# Patient Record
Sex: MALE | Race: WHITE | NOT HISPANIC OR LATINO | Employment: FULL TIME | URBAN - METROPOLITAN AREA
[De-identification: names, ages, dates, MRNs, and addresses within clinical notes are randomized per-mention and may not be internally consistent; named-entity substitution may affect disease eponyms.]

---

## 2017-04-07 ENCOUNTER — OFFICE VISIT (OUTPATIENT)
Dept: FAMILY MEDICINE | Facility: CLINIC | Age: 32
End: 2017-04-07
Payer: COMMERCIAL

## 2017-04-07 VITALS
HEIGHT: 72 IN | DIASTOLIC BLOOD PRESSURE: 80 MMHG | BODY MASS INDEX: 25.47 KG/M2 | TEMPERATURE: 97.1 F | OXYGEN SATURATION: 98 % | HEART RATE: 71 BPM | WEIGHT: 188 LBS | SYSTOLIC BLOOD PRESSURE: 110 MMHG

## 2017-04-07 DIAGNOSIS — R21 RASH: Primary | ICD-10-CM

## 2017-04-07 DIAGNOSIS — H02.9 EYELID LESION: ICD-10-CM

## 2017-04-07 PROCEDURE — 99203 OFFICE O/P NEW LOW 30 MIN: CPT | Performed by: FAMILY MEDICINE

## 2017-04-07 RX ORDER — TRIAMCINOLONE ACETONIDE 1 MG/G
CREAM TOPICAL
Qty: 30 G | Refills: 1 | Status: SHIPPED | OUTPATIENT
Start: 2017-04-07 | End: 2017-08-30

## 2017-04-07 NOTE — MR AVS SNAPSHOT
After Visit Summary   4/7/2017    Ac Becerra    MRN: [id number]           Patient Information     Date Of Birth          1985        Visit Information        Provider Department      4/7/2017 4:20 PM Quincy Mathew MD [facility] [place] [facility]        Today's Diagnoses     Rash    -  1    Eyelid lesion           Follow-ups after your visit        Additional Services     DERMATOLOGY REFERRAL       Your provider has referred you to: [facility] [phone]   [email or web address]    Please be aware that coverage of these services is subject to the terms and limitations of your health insurance plan.  Call member services at your health plan with any benefit or coverage questions.      Please bring the following with you to your appointment:    (1) Any X-Rays, CTs or MRIs which have been performed.  Contact the facility where they were done to arrange for  prior to your scheduled appointment.    (2) List of current medications  (3) This referral request   (4) Any documents/labs given to you for this referral            OPHTHALMOLOGY ADULT REFERRAL       Your provider has referred you to: FMG: [facility] [phone]   [email or web address]  UMP: [facility] [phone]   [email or web address]  UM: [facility] [phone]   [email or web address]  N: [facility] [phone]   [email or web address]    Please be aware that coverage of these services is subject to the terms and limitations of your health insurance plan.  Call member services at your health plan with any benefit or coverage questions.      Please bring the following with you to your appointment:    (1) Any X-Rays, CTs or MRIs which have been performed.  Contact the facility  "where they were done to arrange for  prior to your scheduled appointment.    (2) List of current medications  (3) This referral request   (4) Any documents/labs given to you for this referral                  Your next 10 appointments already scheduled     Apr 07, 2017  4:20 PM CDT   Office Visit with Quincy Mathew MD   [facility]    [address] [facility] [address] [phone]           Bring a current list of meds and any records pertaining to this visit.  For Physicals, please bring immunization records and any forms needing to be filled out.  Please arrive 10 minutes early to complete paperwork.              Who to contact     If you have questions or need follow up information about today's clinic visit or your schedule please contact [facility] directly at [phone].  Normal or non-critical lab and imaging results will be communicated to you by MyChart, letter or phone within 4 business days after the clinic has received the results. If you do not hear from us within 7 days, please contact the clinic through MyChart or phone. If you have a critical or abnormal lab result, we will notify you by phone as soon as possible.  Submit refill requests through [employer] or call your pharmacy and they will forward the refill request to us. Please allow 3 business days for your refill to be completed.          Additional Information About Your Visit        [employer] Information     [employer] lets you send messages to your doctor, view your test results, renew your prescriptions, schedule appointments and more. To sign up, go to [email or web address] . Click on \"Log in\" on the left side of the screen, which will take you to the Welcome page. Then click on \"Sign up Now\" on the right side of the page.     You will be asked to enter the access code listed below, as well as some personal information. Please follow the directions to create your " username and password.     Your access code is: [email or web address]  Expires: 2017  4:06 PM     Your access code will  in 90 days. If you need help or a new code, please call your [facility] clinic or [phone].        Care EveryWhere ID     This is your Care EveryWhere ID. This could be used by other organizations to access your [facility] medical records  [id number]        Your Vitals Were     Pulse Temperature Height Pulse Oximetry BMI (Body Mass Index)       71 97.1  F (36.2  C) (Oral) 6' (1.829 m) 98% 25.5 kg/m2        Blood Pressure from Last 3 Encounters:   17 110/80    Weight from Last 3 Encounters:   17 188 lb (85.3 kg)              We Performed the Following     DERMATOLOGY REFERRAL     OPHTHALMOLOGY ADULT REFERRAL          Today's Medication Changes          These changes are accurate as of: 17  4:06 PM.  If you have any questions, ask your nurse or doctor.               Start taking these medicines.        Dose/Directions    triamcinolone 0.1 % cream   Commonly known as:  KENALOG   Used for:  Rash   Started by:  Quincy Mathew MD        Apply sparingly to affected area two times daily for up to 10 days.   Quantity:  30 g   Refills:  1            Where to get your medicines      These medications were sent to [facility] [address], [facility] [address]  [address] [place] [address]     Phone:  [phone]     triamcinolone 0.1 % cream                Primary Care Provider Office Phone # Fax #    [facility] [phone]       [address] [place] [address]. [facility] [address]        Thank you!     Thank you for choosing [facility]  for your care. Our goal is always to provide you with excellent care. Hearing back from our patients is one way we can continue to improve our services. Please take a few minutes to complete the written survey that you may receive in the mail after your visit with us. Thank you!              Your Updated Medication List - Protect others around you: Learn how to safely use, store and throw away your medicines at www.disposemymeds.org.          This list is accurate as of: 4/7/17  4:06 PM.  Always use your most recent med list.                   Brand Name Dispense Instructions for use    triamcinolone 0.1 % cream    KENALOG    30 g    Apply sparingly to affected area two times daily for up to 10 days.

## 2017-04-07 NOTE — NURSING NOTE
Chief Complaint   Patient presents with     Derm Problem     rash on right cheek     Eye Problem     right        Initial /80  Pulse 71  Temp 97.1  F (36.2  C) (Oral)  Ht 6' (1.829 m)  Wt 188 lb (85.3 kg)  SpO2 98%  BMI 25.5 kg/m2 Estimated body mass index is 25.5 kg/(m^2) as calculated from the following:    Height as of this encounter: 6' (1.829 m).    Weight as of this encounter: 188 lb (85.3 kg).  Medication Reconciliation: complete   Denisse Nichols, CMA

## 2017-04-11 ENCOUNTER — TELEPHONE (OUTPATIENT)
Dept: OTHER | Facility: CLINIC | Age: 32
End: 2017-04-11

## 2017-04-11 NOTE — TELEPHONE ENCOUNTER
4/11/2017    Call Regarding Onboarding are Choices    Attempt 1    Message on voicemail     Comments:       Outreach   jani

## 2017-06-05 ENCOUNTER — TELEPHONE (OUTPATIENT)
Dept: OTHER | Facility: CLINIC | Age: 32
End: 2017-06-05

## 2017-07-28 ENCOUNTER — TRANSFERRED RECORDS (OUTPATIENT)
Dept: HEALTH INFORMATION MANAGEMENT | Facility: CLINIC | Age: 32
End: 2017-07-28

## 2017-08-30 ENCOUNTER — OFFICE VISIT (OUTPATIENT)
Dept: FAMILY MEDICINE | Facility: CLINIC | Age: 32
End: 2017-08-30
Payer: COMMERCIAL

## 2017-08-30 VITALS
WEIGHT: 191 LBS | TEMPERATURE: 98.3 F | HEART RATE: 79 BPM | SYSTOLIC BLOOD PRESSURE: 136 MMHG | BODY MASS INDEX: 25.9 KG/M2 | DIASTOLIC BLOOD PRESSURE: 80 MMHG

## 2017-08-30 DIAGNOSIS — Z23 NEED FOR VACCINATION: ICD-10-CM

## 2017-08-30 DIAGNOSIS — H10.9 BACTERIAL CONJUNCTIVITIS OF LEFT EYE: Primary | ICD-10-CM

## 2017-08-30 PROCEDURE — 99212 OFFICE O/P EST SF 10 MIN: CPT | Mod: 25 | Performed by: PHYSICIAN ASSISTANT

## 2017-08-30 PROCEDURE — 90471 IMMUNIZATION ADMIN: CPT | Performed by: PHYSICIAN ASSISTANT

## 2017-08-30 PROCEDURE — 90715 TDAP VACCINE 7 YRS/> IM: CPT | Performed by: PHYSICIAN ASSISTANT

## 2017-08-30 RX ORDER — OFLOXACIN 3 MG/ML
1 SOLUTION/ DROPS OPHTHALMIC EVERY 4 HOURS
Qty: 3 ML | Refills: 0 | Status: SHIPPED | OUTPATIENT
Start: 2017-08-30 | End: 2017-09-06

## 2017-08-30 NOTE — MR AVS SNAPSHOT
"              After Visit Summary   2017    Ac Becerra    MRN: [id number]           Patient Information     Date Of Birth          1985        Visit Information        Provider Department      2017 9:20 AM Vanessa Floyd PA-C [facility]        Today's Diagnoses     Bacterial conjunctivitis of left eye    -  1    Need for vaccination           Follow-ups after your visit        Who to contact     Normal or non-critical lab and imaging results will be communicated to you by [facility], letter or phone within 4 business days after the clinic has received the results. If you do not hear from us within 7 days, please contact the clinic through [facility] or phone. If you have a critical or abnormal lab result, we will notify you by phone as soon as possible.  Submit refill requests through [employer] or call your pharmacy and they will forward the refill request to us. Please allow 3 business days for your refill to be completed.          If you need to speak with a  for additional information , please call: [phone]             Additional Information About Your Visit        [facility] Information     [employer] lets you send messages to your doctor, view your test results, renew your prescriptions, schedule appointments and more. To sign up, go to [email or web address] . Click on \"Log in\" on the left side of the screen, which will take you to the Welcome page. Then click on \"Sign up Now\" on the right side of the page.     You will be asked to enter the access code listed below, as well as some personal information. Please follow the directions to create your username and password.     Your access code is: [email or web address]  Expires: 2017  9:36 AM     Your access code will  in 90 days. If you need help or a new code, please call your [facility] or [phone].        Care EveryWhere ID     This is your Care EveryWhere ID. This could be used by other organizations " to access your [facility] medical records  [id number]        Your Vitals Were     Pulse Temperature BMI (Body Mass Index)             79 98.3  F (36.8  C) (Oral) 25.9 kg/m2          Blood Pressure from Last 3 Encounters:   08/30/17 136/80   04/07/17 110/80    Weight from Last 3 Encounters:   08/30/17 191 lb (86.6 kg)   04/07/17 188 lb (85.3 kg)              We Performed the Following     ADMIN 1st VACCINE     TDAP VACCINE (ADACEL)          Today's Medication Changes          These changes are accurate as of: 8/30/17  9:36 AM.  If you have any questions, ask your nurse or doctor.               Start taking these medicines.        Dose/Directions    ofloxacin 0.3 % ophthalmic solution   Commonly known as:  OCUFLOX   Used for:  Bacterial conjunctivitis of left eye   Started by:  Vanessa Floyd PA-C        Dose:  1 drop   Place 1 drop Into the left eye every 4 hours for 7 days   Quantity:  3 mL   Refills:  0         Stop taking these medicines if you haven't already. Please contact your care team if you have questions.     triamcinolone 0.1 % cream   Commonly known as:  KENALOG   Stopped by:  Vanessa Floyd PA-C                Where to get your medicines      These medications were sent to [facility] - [address] [facility]  [address] [facility] [address] [facility] [address] [place] [address]     Phone:  [phone]     ofloxacin 0.3 % ophthalmic solution                Primary Care Provider Office Phone # Fax #    [facility] [phone] [address] [facility]. [facility] [address]        Equal Access to Services     [facility] AH: Hadii aad ku hadasho Soomaali, waaxda luqadaha, qaybta kaalmada adeegyada, ashish hutton haybenito henry. So [facility] [phone].    ATENCIÓN: Si habla español, tiene a quintero disposición servicios gratuitos de asistencia lingüística. Llame al [phone].    We comply with applicable federal civil rights laws and [place] laws. We do not  discriminate on the basis of race, color, national origin, age, disability sex, sexual orientation or gender identity.            Thank you!     Thank you for choosing [facility]  for your care. Our goal is always to provide you with excellent care. Hearing back from our patients is one way we can continue to improve our services. Please take a few minutes to complete the written survey that you may receive in the mail after your visit with us. Thank you!             Your Updated Medication List - Protect others around you: Learn how to safely use, store and throw away your medicines at www.disposemymeds.org.          This list is accurate as of: 8/30/17  9:36 AM.  Always use your most recent med list.                   Brand Name Dispense Instructions for use Diagnosis    ofloxacin 0.3 % ophthalmic solution    OCUFLOX    3 mL    Place 1 drop Into the left eye every 4 hours for 7 days    Bacterial conjunctivitis of left eye

## 2017-08-30 NOTE — PROGRESS NOTES
SUBJECTIVE:   Ac Becerra is a 32 year old male who presents to clinic today for the following health issues:      Eye(s) Problem  Onset: x1  day    Description:   Location: left  Pain: YES  Redness: YES    Accompanying Signs & Symptoms:  Discharge/mattering: YES  Swelling: YES  Visual changes: no  Fever: no   Nasal Congestion: no  Bothered by bright lights: no     History:   Trauma: no   Foreign body exposure: no    Precipitating factors:   Wearing contacts: YES    Alleviating factors:  Improved by: none     Therapies Tried and outcome: NA        Problem list and histories reviewed & adjusted, as indicated.  Additional history: as documented    There is no problem list on file for this patient.    No past surgical history on file.    Social History   Substance Use Topics     Smoking status: Never Smoker     Smokeless tobacco: Not on file     Alcohol use Not on file     No family history on file.          Reviewed and updated as needed this visit by clinical staffTobacco  Allergies  Meds       Reviewed and updated as needed this visit by Provider         ROS:  Constitutional, HEENT systems are negative, except as otherwise noted.      OBJECTIVE:                                                    /80  Pulse 79  Temp 98.3  F (36.8  C) (Oral)  Wt 191 lb (86.6 kg)  BMI 25.9 kg/m2  Body mass index is 25.9 kg/(m^2).  GENERAL APPEARANCE: healthy, alert and no distress  EYES: Eyes grossly normal to inspection and conjunctiva/corneas- conjunctival injection left  HENT: ear canals and TM's normal and nose and mouth without ulcers or lesions  LYMPHATICS: normal ant/post cervical and supraclavicular nodes       ASSESSMENT:                                                      1. Bacterial conjunctivitis of left eye    2. Need for vaccination         PLAN:                                                    Ofloxacin gtts Q4 hours x7 days. Supportive therapy also discussed. Follow up if symptoms fail to improve or  worsen.      The patient was in agreement with the plan today and had no questions or concerns prior to leaving the clinic.     Vanessa Floyd PA-C  [facility] SANDIP

## 2017-08-30 NOTE — NURSING NOTE
Chief Complaint   Patient presents with     Swollen Eye       Initial /80  Pulse 79  Temp 98.3  F (36.8  C) (Oral)  Wt 191 lb (86.6 kg)  BMI 25.9 kg/m2 Estimated body mass index is 25.9 kg/(m^2) as calculated from the following:    Height as of 4/7/17: 6' (1.829 m).    Weight as of this encounter: 191 lb (86.6 kg).  Medication Reconciliation: adeline Ramos,CMA

## 2017-09-03 ENCOUNTER — NURSE TRIAGE (OUTPATIENT)
Dept: NURSING | Facility: CLINIC | Age: 32
End: 2017-09-03

## 2017-09-03 NOTE — TELEPHONE ENCOUNTER
Reason for Disposition    Difficult to awaken or acting confused (e.g., disoriented, slurred speech)    Additional Information    Negative: Severe difficulty breathing (e.g., struggling for each breath, speaks in single words)    Negative: Bluish lips, tongue, or face now    Negative: [1] Rapid onset of cough AND [2] has hives    Negative: Coughing started suddenly after medicine, an allergic food or bee sting    Negative: [1] Difficulty breathing AND [2] exposure to flames, smoke, or fumes    Negative: [1] Stridor AND [2] difficulty breathing    Negative: Sounds like a life-threatening emergency to the triager    Negative: Choked on object of food that could be caught in the throat    Chest pain is main symptom    Negative: Severe difficulty breathing (e.g., struggling for each breath, speaks in single words)    Protocols used: CHEST PAIN-ADULT-AH, COUGH - ACUTE NON-PRODUCTIVE-ADULT-AH    Pt. Calls and says that he has a bad cough. Cough began on 8/25/17. Cough is nonproductive. Pt. Says nothing helps his cough. Cough causes pt. Pain in his chest, too. Pt. Refuses to call 911. Pt. Says that he will have someone take him to the ER this jessica.

## 2018-06-11 ENCOUNTER — OFFICE VISIT (OUTPATIENT)
Dept: FAMILY MEDICINE | Facility: CLINIC | Age: 33
End: 2018-06-11
Payer: COMMERCIAL

## 2018-06-11 VITALS
OXYGEN SATURATION: 100 % | DIASTOLIC BLOOD PRESSURE: 82 MMHG | HEIGHT: 71 IN | HEART RATE: 99 BPM | SYSTOLIC BLOOD PRESSURE: 130 MMHG | BODY MASS INDEX: 28.28 KG/M2 | WEIGHT: 202 LBS | TEMPERATURE: 98.1 F

## 2018-06-11 DIAGNOSIS — Z13.1 SCREENING FOR DIABETES MELLITUS: ICD-10-CM

## 2018-06-11 DIAGNOSIS — R36.1 BLOOD IN SEMEN: ICD-10-CM

## 2018-06-11 DIAGNOSIS — R21 RASH AND NONSPECIFIC SKIN ERUPTION: ICD-10-CM

## 2018-06-11 DIAGNOSIS — Z00.00 ENCOUNTER FOR ROUTINE ADULT HEALTH EXAMINATION WITHOUT ABNORMAL FINDINGS: Primary | ICD-10-CM

## 2018-06-11 DIAGNOSIS — Z13.6 CARDIOVASCULAR SCREENING; LDL GOAL LESS THAN 100: ICD-10-CM

## 2018-06-11 DIAGNOSIS — R10.31 RIGHT GROIN PAIN: ICD-10-CM

## 2018-06-11 LAB
ALBUMIN UR-MCNC: NEGATIVE MG/DL
APPEARANCE UR: CLEAR
BILIRUB UR QL STRIP: NEGATIVE
COLOR UR AUTO: YELLOW
GLUCOSE UR STRIP-MCNC: 100 MG/DL
HGB UR QL STRIP: NEGATIVE
KETONES UR STRIP-MCNC: NEGATIVE MG/DL
LEUKOCYTE ESTERASE UR QL STRIP: NEGATIVE
NITRATE UR QL: NEGATIVE
PH UR STRIP: 6 PH (ref 5–7)
SOURCE: ABNORMAL
SP GR UR STRIP: 1.02 (ref 1–1.03)
UROBILINOGEN UR STRIP-ACNC: 0.2 EU/DL (ref 0.2–1)

## 2018-06-11 PROCEDURE — 99213 OFFICE O/P EST LOW 20 MIN: CPT | Mod: 25 | Performed by: NURSE PRACTITIONER

## 2018-06-11 PROCEDURE — 99395 PREV VISIT EST AGE 18-39: CPT | Performed by: NURSE PRACTITIONER

## 2018-06-11 PROCEDURE — 80048 BASIC METABOLIC PNL TOTAL CA: CPT | Performed by: NURSE PRACTITIONER

## 2018-06-11 PROCEDURE — 80061 LIPID PANEL: CPT | Performed by: NURSE PRACTITIONER

## 2018-06-11 PROCEDURE — 81003 URINALYSIS AUTO W/O SCOPE: CPT | Performed by: NURSE PRACTITIONER

## 2018-06-11 PROCEDURE — 36415 COLL VENOUS BLD VENIPUNCTURE: CPT | Performed by: NURSE PRACTITIONER

## 2018-06-11 ASSESSMENT — PAIN SCALES - GENERAL: PAINLEVEL: MILD PAIN (3)

## 2018-06-11 NOTE — PROGRESS NOTES
SUBJECTIVE:   CC: Ac Becerra is an 32 year old male who presents for preventative health visit.     Healthy Habits:    Do you get at least three servings of calcium containing foods daily (dairy, green leafy vegetables, etc.)? Not applicable    Amount of exercise or daily activities, outside of work: not applicable    Problems taking medications regularly No    Medication side effects: No    Have you had an eye exam in the past two years? yes    Do you see a dentist twice per year? yes    Do you have sleep apnea, excessive snoring or daytime drowsiness?no     Concern:   Blood in semen and urine:  Happened after ejaculation once a month ago.  When he urinated several hours later, he had small amount of blood.  Denies trauma.  No increase in sexual activity.  Not associated with pain.  Has one sexual partner with negative STD testing in the past.   Bulge in right groin- occurred after putting his son's bunk bed together 4 days ago.  He does work lifting heavy objects daily.   Has not had this before.  States bump is slightly painful and pain does radiate into right testicle.  No testicular enlargement.    Spots on right cheek:  Saw derm who called it telangiectasia and offered laser treatment but patient did not want to do this.  Patient denies pain or itching, bumps are the same as they have been for several years.        Today's PHQ-2 Score:   PHQ-2 ( 1999 Pfizer) 8/30/2017   Q1: Little interest or pleasure in doing things 0   Q2: Feeling down, depressed or hopeless 0   PHQ-2 Score 0       Abuse: Current or Past(Physical, Sexual or Emotional)- No  Do you feel safe in your environment - Yes    Social History   Substance Use Topics     Smoking status: Never Smoker     Smokeless tobacco: Not on file     Alcohol use Not on file      If you drink alcohol do you typically have >3 drinks per day or >7 drinks per week? NO-                     Last PSA: No results found for: PSA    Reviewed orders with patient. Reviewed  health maintenance and updated orders accordingly - No  BP Readings from Last 3 Encounters:   06/11/18 130/82   08/30/17 136/80   04/07/17 110/80    Wt Readings from Last 3 Encounters:   06/11/18 202 lb (91.6 kg)   08/30/17 191 lb (86.6 kg)   04/07/17 188 lb (85.3 kg)                  There is no problem list on file for this patient.    Past Surgical History:   Procedure Laterality Date     NO HISTORY OF SURGERY         Social History   Substance Use Topics     Smoking status: Never Smoker     Smokeless tobacco: Never Used     Alcohol use Yes      Comment: occ     Family History   Problem Relation Age of Onset     No Known Problems Mother      No Known Problems Father      unknown     No Known Problems Brother      DIABETES Maternal Grandmother      CANCER Maternal Grandfather      throat; cancer     No Known Problems Brother          Current Outpatient Prescriptions   Medication Sig Dispense Refill     azelaic acid (AZELEX) 20 % cream Apply 1 g topically 2 times daily 50 g 11     No Known Allergies  No lab results found.     Reviewed and updated as needed this visit by clinical staff  Tobacco  Allergies  Meds  Problems  Fam Hx  Soc Hx        Reviewed and updated as needed this visit by Provider  Allergies  Meds  Problems  Fam Hx        Past Medical History:   Diagnosis Date     NO ACTIVE PROBLEMS       Past Surgical History:   Procedure Laterality Date     NO HISTORY OF SURGERY         ROS:  CONSTITUTIONAL: NEGATIVE for fever, chills, change in weight  INTEGUMENTARY/SKIN: NEGATIVE for worrisome rashes, moles or lesions; see HPI  EYES: NEGATIVE for vision changes or irritation  ENT: NEGATIVE for ear, mouth and throat problems  RESP: NEGATIVE for significant cough or SOB  CV: NEGATIVE for chest pain, palpitations or peripheral edema  GI: NEGATIVE for nausea, abdominal pain, heartburn, or change in bowel habits   male: see HPI  MUSCULOSKELETAL: NEGATIVE for significant arthralgias or myalgia  NEURO:  "NEGATIVE for weakness, dizziness or paresthesias  PSYCHIATRIC: NEGATIVE for changes in mood or affect    OBJECTIVE:   /82 (BP Location: Left arm, Patient Position: Chair, Cuff Size: Adult Regular)  Pulse 99  Temp 98.1  F (36.7  C) (Oral)  Ht 5' 11.25\" (1.81 m)  Wt 202 lb (91.6 kg)  SpO2 100%  BMI 27.98 kg/m2  EXAM:  GENERAL: healthy, alert and no distress  EYES: Eyes grossly normal to inspection, PERRL and conjunctivae and sclerae normal  HENT: ear canals and TM's normal, nose and mouth without ulcers or lesions  NECK: no adenopathy, no asymmetry, masses, or scars and thyroid normal to palpation  RESP: lungs clear to auscultation - no rales, rhonchi or wheezes  CV: regular rate and rhythm, normal S1 S2, no S3 or S4, no murmur, click or rub, no peripheral edema and peripheral pulses strong  ABDOMEN: bowel sounds normal and mass: compressible in right groin, tender to the touch, about 2cm in diameter   (male): testicles normal without atrophy or masses, hernia right inguinal and penis normal without urethral discharge  MS: no gross musculoskeletal defects noted, no edema  SKIN: right cheek: 3-4 small reddened bumps without fluid or surrounding erythema  NEURO: Normal strength and tone, mentation intact and speech normal  PSYCH: mentation appears normal, affect normal/bright        ASSESSMENT/PLAN:   1. Encounter for routine adult health examination without abnormal findings  - Basic metabolic panel  (Ca, Cl, CO2, Creat, Gluc, K, Na, BUN)    2. Right groin pain  Suspect hernia.  Plan as below.  - US Extremity Non Vascular Right; Future  - *UA reflex to Microscopic and Culture ([facility] (except [place] and [place])    3. Rash and nonspecific skin eruption  Dermatology called this telangiectasia but appears like rosacea.  Will trial below and if treatment fails, suggest follow up with derm.  - azelaic acid (AZELEX) 20 % cream; Apply 1 g topically 2 times daily  Dispense: 50 g; Refill: " 11    4. Blood in semen  Isolated incident.  If occurs again, patient to follow up.    5.  Screening for diabetes mellitus  - Basic metabolic panel  (Ca, Cl, CO2, Creat, Gluc, K, Na, BUN)    6.. CARDIOVASCULAR SCREENING; LDL GOAL LESS THAN 100  - Lipid panel reflex to direct LDL Fasting    COUNSELING:  Reviewed preventive health counseling, as reflected in patient instructions       Regular exercise       Healthy diet/nutrition       Vision screening       Family planning       reports that he has never smoked. He does not have any smokeless tobacco history on file.    Estimated body mass index is 25.9 kg/(m^2) as calculated from the following:    Height as of 4/7/17: 6' (1.829 m).    Weight as of 8/30/17: 191 lb (86.6 kg).       Counseling Resources:  ATP IV Guidelines  Pooled Cohorts Equation Calculator  FRAX Risk Assessment  ICSI Preventive Guidelines  Dietary Guidelines for Americans, 2010  [employer] MyPlate  ASA Prophylaxis  Lung CA Screening    AMIRA Munguia [facility]    Patient Instructions   Can use Azelaic acid twice a day on lesion on face to see if it helps.  If not, might be good to follow up with derm.    Please schedule the ultrasound to assess for the hernia as we discussed    Preventive Health Recommendations  Male Ages 26 - 39    Yearly exam:             See your health care provider every year in order to  o   Review health changes.   o   Discuss preventive care.    o   Review your medicines if your doctor has prescribed any.    You should be tested each year for STDs (sexually transmitted diseases), if you re at risk.     After age 35, talk to your provider about cholesterol testing. If you are at risk for heart disease, have your cholesterol tested at least every 5 years.     If you are at risk for diabetes, you should have a diabetes test (fasting glucose).  Shots: Get a flu shot each year. Get a tetanus shot every 10 years.     Nutrition:    Eat at least 5  servings of fruits and vegetables daily.     Eat whole-grain bread, whole-wheat pasta and brown rice instead of white grains and rice.     Talk to your provider about Calcium and Vitamin D.     Lifestyle    Exercise for at least 150 minutes a week (30 minutes a day, 5 days a week). This will help you control your weight and prevent disease.     Limit alcohol to one drink per day.     No smoking.     Wear sunscreen to prevent skin cancer.     See your dentist every six months for an exam and cleaning.

## 2018-06-11 NOTE — PATIENT INSTRUCTIONS
Can use Azelaic acid twice a day on lesion on face to see if it helps.  If not, might be good to follow up with derm.    Please schedule the ultrasound to assess for the hernia as we discussed    Preventive Health Recommendations  Male Ages 26 - 39    Yearly exam:             See your health care provider every year in order to  o   Review health changes.   o   Discuss preventive care.    o   Review your medicines if your doctor has prescribed any.    You should be tested each year for STDs (sexually transmitted diseases), if you re at risk.     After age 35, talk to your provider about cholesterol testing. If you are at risk for heart disease, have your cholesterol tested at least every 5 years.     If you are at risk for diabetes, you should have a diabetes test (fasting glucose).  Shots: Get a flu shot each year. Get a tetanus shot every 10 years.     Nutrition:    Eat at least 5 servings of fruits and vegetables daily.     Eat whole-grain bread, whole-wheat pasta and brown rice instead of white grains and rice.     Talk to your provider about Calcium and Vitamin D.     Lifestyle    Exercise for at least 150 minutes a week (30 minutes a day, 5 days a week). This will help you control your weight and prevent disease.     Limit alcohol to one drink per day.     No smoking.     Wear sunscreen to prevent skin cancer.     See your dentist every six months for an exam and cleaning.

## 2018-06-11 NOTE — MR AVS SNAPSHOT
After Visit Summary   6/11/2018    Ac Becerra    MRN: [id number]           Patient Information     Date Of Birth          1985        Visit Information        Provider Department      6/11/2018 6:20 PM Georgia Mackay APRN [facility]        Today's Diagnoses     Encounter for routine adult health examination without abnormal findings    -  1    Telangiectasia        Right groin pain        Screening for diabetes mellitus        CARDIOVASCULAR SCREENING; LDL GOAL LESS THAN 100          Care Instructions    Can use Azelaic acid twice a day on lesion on face to see if it helps.  If not, might be good to follow up with derm.    Please schedule the ultrasound to assess for the hernia as we discussed    Preventive Health Recommendations  Male Ages 26 - 39    Yearly exam:             See your health care provider every year in order to  o   Review health changes.   o   Discuss preventive care.    o   Review your medicines if your doctor has prescribed any.    You should be tested each year for STDs (sexually transmitted diseases), if you re at risk.     After age 35, talk to your provider about cholesterol testing. If you are at risk for heart disease, have your cholesterol tested at least every 5 years.     If you are at risk for diabetes, you should have a diabetes test (fasting glucose).  Shots: Get a flu shot each year. Get a tetanus shot every 10 years.     Nutrition:    Eat at least 5 servings of fruits and vegetables daily.     Eat whole-grain bread, whole-wheat pasta and brown rice instead of white grains and rice.     Talk to your provider about Calcium and Vitamin D.     Lifestyle    Exercise for at least 150 minutes a week (30 minutes a day, 5 days a week). This will help you control your weight and prevent disease.     Limit alcohol to one drink per day.     No smoking.     Wear sunscreen to prevent skin cancer.     See your dentist every six months for  "an exam and cleaning.             Follow-ups after your visit        Future tests that were ordered for you today     Open Future Orders        Priority Expected Expires Ordered    US Extremity Non Vascular Right Routine  2019            Who to contact     If you have questions or need follow up information about today's clinic visit or your schedule please contact [facility] directly at [phone].  Normal or non-critical lab and imaging results will be communicated to you by [employer], letter or phone within 4 business days after the clinic has received the results. If you do not hear from us within 7 days, please contact the clinic through [employer] or phone. If you have a critical or abnormal lab result, we will notify you by phone as soon as possible.  Submit refill requests through [employer] or call your pharmacy and they will forward the refill request to us. Please allow 3 business days for your refill to be completed.          Additional Information About Your Visit        [employer] Information     Docebo lets you send messages to your doctor, view your test results, renew your prescriptions, schedule appointments and more. To sign up, go to [email or web address] . Click on \"Log in\" on the left side of the screen, which will take you to the Welcome page. Then click on \"Sign up Now\" on the right side of the page.     You will be asked to enter the access code listed below, as well as some personal information. Please follow the directions to create your username and password.     Your access code is: [email or web address]  Expires: 2018  6:58 PM     Your access code will  in 90 days. If you need help or a new code, please call your [facility] or [phone].        Care EveryWhere ID     This is your Care EveryWhere ID. This could be used by other organizations to access your [facility] medical records  [id number]        Your Vitals Were     Pulse Temperature Height " "Pulse Oximetry BMI (Body Mass Index)       99 98.1  F (36.7  C) (Oral) 5' 11.25\" (1.81 m) 100% 27.98 kg/m2        Blood Pressure from Last 3 Encounters:   06/11/18 130/82   08/30/17 136/80   04/07/17 110/80    Weight from Last 3 Encounters:   06/11/18 202 lb (91.6 kg)   08/30/17 191 lb (86.6 kg)   04/07/17 188 lb (85.3 kg)              We Performed the Following     *UA reflex to Microscopic and Culture ([facility] (except [place] and [place])     Basic metabolic panel  (Ca, Cl, CO2, Creat, Gluc, K, Na, BUN)     Lipid panel reflex to direct LDL Fasting          Today's Medication Changes          These changes are accurate as of 6/11/18  6:58 PM.  If you have any questions, ask your nurse or doctor.               Start taking these medicines.        Dose/Directions    azelaic acid 20 % cream   Commonly known as:  AZELEX   Used for:  Telangiectasia   Started by:  Georgia Mackay APRN CNP        Dose:  1 g   Apply 1 g topically 2 times daily   Quantity:  50 g   Refills:  11            Where to get your medicines      These medications [id number] [place] - [address]  [address] [place] [address]     Phone:  [phone]     azelaic acid 20 % cream                Primary Care Provider Office Phone # Fax #    [facility] [phone] [address] [facility] [facility] [address]        Equal Access to Services     [facility] : Hadii aad ku hadasho Soomaali, waaxda luqadaha, qaybta kaalmada adeegalexander, ashish idieddi henry. So [facility] [phone].    ATENCIÓN: Si habla español, tiene a quintero disposición servicios gratuitos de asistencia lingüística. Llame al [phone].    We comply with applicable federal civil rights laws and [place] laws. We do not discriminate on the basis of race, color, national origin, age, disability, sex, sexual orientation, or gender identity.            Thank you!     Thank you " for choosing [facility]  for your care. Our goal is always to provide you with excellent care. Hearing back from our patients is one way we can continue to improve our services. Please take a few minutes to complete the written survey that you may receive in the mail after your visit with us. Thank you!             Your Updated Medication List - Protect others around you: Learn how to safely use, store and throw away your medicines at www.disposemymeds.org.          This list is accurate as of 6/11/18  6:58 PM.  Always use your most recent med list.                   Brand Name Dispense Instructions for use Diagnosis    azelaic acid 20 % cream    AZELEX    50 g    Apply 1 g topically 2 times daily    Telangiectasia

## 2018-06-12 ENCOUNTER — RADIANT APPOINTMENT (OUTPATIENT)
Dept: ULTRASOUND IMAGING | Facility: CLINIC | Age: 33
End: 2018-06-12
Attending: NURSE PRACTITIONER
Payer: COMMERCIAL

## 2018-06-12 ENCOUNTER — TELEPHONE (OUTPATIENT)
Dept: FAMILY MEDICINE | Facility: CLINIC | Age: 33
End: 2018-06-12

## 2018-06-12 DIAGNOSIS — R10.31 RIGHT GROIN PAIN: ICD-10-CM

## 2018-06-12 DIAGNOSIS — R21 RASH AND NONSPECIFIC SKIN ERUPTION: Primary | ICD-10-CM

## 2018-06-12 PROBLEM — R36.1 BLOOD IN SEMEN: Status: ACTIVE | Noted: 2018-06-12

## 2018-06-12 LAB
ANION GAP SERPL CALCULATED.3IONS-SCNC: 8 MMOL/L (ref 3–14)
BUN SERPL-MCNC: 17 MG/DL (ref 7–30)
CALCIUM SERPL-MCNC: 9.1 MG/DL (ref 8.5–10.1)
CHLORIDE SERPL-SCNC: 103 MMOL/L (ref 94–109)
CHOLEST SERPL-MCNC: 180 MG/DL
CO2 SERPL-SCNC: 29 MMOL/L (ref 20–32)
CREAT SERPL-MCNC: 1.08 MG/DL (ref 0.66–1.25)
GFR SERPL CREATININE-BSD FRML MDRD: 79 ML/MIN/1.7M2
GLUCOSE SERPL-MCNC: 112 MG/DL (ref 70–99)
HDLC SERPL-MCNC: 46 MG/DL
LDLC SERPL CALC-MCNC: 95 MG/DL
NONHDLC SERPL-MCNC: 134 MG/DL
POTASSIUM SERPL-SCNC: 3.4 MMOL/L (ref 3.4–5.3)
SODIUM SERPL-SCNC: 140 MMOL/L (ref 133–144)
TRIGL SERPL-MCNC: 196 MG/DL

## 2018-06-12 PROCEDURE — 76882 US LMTD JT/FCL EVL NVASC XTR: CPT | Mod: RT

## 2018-06-12 RX ORDER — METRONIDAZOLE 7.5 MG/G
GEL TOPICAL 2 TIMES DAILY
Qty: 45 G | Refills: 11 | Status: SHIPPED | OUTPATIENT
Start: 2018-06-12 | End: 2019-03-17

## 2018-06-12 NOTE — TELEPHONE ENCOUNTER
Plan does not cover this medication. Please call plan at [phone] to initiate prior authorization or call/fax pharmacy to change medication at [phone]. Patient ID # is [id number].        [facility]

## 2018-06-12 NOTE — TELEPHONE ENCOUNTER
Will change to metrogel (showing up on Epic as covered)  Can use twice a day.  Should use sun protection diligently as can increase sensitivity to the sun.    Thanks!  Georgia

## 2018-06-13 DIAGNOSIS — E78.00 ELEVATED CHOLESTEROL: ICD-10-CM

## 2018-06-13 DIAGNOSIS — R81 GLUCOSURIA: Primary | ICD-10-CM

## 2018-07-06 DIAGNOSIS — R81 GLUCOSURIA: ICD-10-CM

## 2018-07-06 DIAGNOSIS — E78.00 ELEVATED CHOLESTEROL: ICD-10-CM

## 2018-07-06 LAB
ALBUMIN UR-MCNC: NEGATIVE MG/DL
APPEARANCE UR: CLEAR
BILIRUB UR QL STRIP: NEGATIVE
CHOLEST SERPL-MCNC: 192 MG/DL
COLOR UR AUTO: YELLOW
GLUCOSE SERPL-MCNC: 98 MG/DL (ref 70–99)
GLUCOSE UR STRIP-MCNC: NEGATIVE MG/DL
HBA1C MFR BLD: 5 % (ref 0–5.6)
HDLC SERPL-MCNC: 50 MG/DL
HGB UR QL STRIP: NEGATIVE
KETONES UR STRIP-MCNC: NEGATIVE MG/DL
LDLC SERPL CALC-MCNC: 123 MG/DL
LEUKOCYTE ESTERASE UR QL STRIP: NEGATIVE
NITRATE UR QL: NEGATIVE
NONHDLC SERPL-MCNC: 142 MG/DL
PH UR STRIP: 7.5 PH (ref 5–7)
SOURCE: ABNORMAL
SP GR UR STRIP: 1.02 (ref 1–1.03)
TRIGL SERPL-MCNC: 97 MG/DL
UROBILINOGEN UR STRIP-ACNC: 0.2 EU/DL (ref 0.2–1)

## 2018-07-06 PROCEDURE — 81003 URINALYSIS AUTO W/O SCOPE: CPT | Performed by: NURSE PRACTITIONER

## 2018-07-06 PROCEDURE — 82947 ASSAY GLUCOSE BLOOD QUANT: CPT | Performed by: NURSE PRACTITIONER

## 2018-07-06 PROCEDURE — 80061 LIPID PANEL: CPT | Performed by: NURSE PRACTITIONER

## 2018-07-06 PROCEDURE — 36415 COLL VENOUS BLD VENIPUNCTURE: CPT | Performed by: NURSE PRACTITIONER

## 2018-07-06 PROCEDURE — 83036 HEMOGLOBIN GLYCOSYLATED A1C: CPT | Performed by: NURSE PRACTITIONER

## 2018-08-11 ENCOUNTER — OFFICE VISIT (OUTPATIENT)
Dept: URGENT CARE | Facility: URGENT CARE | Age: 33
End: 2018-08-11
Payer: COMMERCIAL

## 2018-08-11 VITALS
OXYGEN SATURATION: 98 % | BODY MASS INDEX: 27.32 KG/M2 | WEIGHT: 197.3 LBS | HEART RATE: 115 BPM | TEMPERATURE: 98.5 F | DIASTOLIC BLOOD PRESSURE: 101 MMHG | SYSTOLIC BLOOD PRESSURE: 150 MMHG

## 2018-08-11 DIAGNOSIS — S61.411A LACERATION OF RIGHT HAND, INITIAL ENCOUNTER: Primary | ICD-10-CM

## 2018-08-11 NOTE — MR AVS SNAPSHOT
After Visit Summary   8/11/2018    Ac Becerra    MRN: [id number]           Patient Information     Date Of Birth          1985        Visit Information        Provider Department      8/11/2018 9:15 AM Fabian Peguero APRN CNP [facility]        Today's Diagnoses     Laceration of right hand, initial encounter    -  1       Follow-ups after your visit        Follow-up notes from your care team     Return for Proceed to ED.      Who to contact     If you have questions or need follow up information about today's clinic visit or your schedule please contact [facility] directly at [phone].  Normal or non-critical lab and imaging results will be communicated to you by MyChart, letter or phone within 4 business days after the clinic has received the results. If you do not hear from us within 7 days, please contact the clinic through [employer] or phone. If you have a critical or abnormal lab result, we will notify you by phone as soon as possible.  Submit refill requests through InMyShow or call your pharmacy and they will forward the refill request to us. Please allow 3 business days for your refill to be completed.          Additional Information About Your Visit        MyChart Information     InMyShow gives you secure access to your electronic health record. If you see a primary care provider, you can also send messages to your care team and make appointments. If you have questions, please call your primary care clinic.  If you do not have a primary care provider, please call [phone] and they will assist you.        Care EveryWhere ID     This is your Care EveryWhere ID. This could be used by other organizations to access your [place] medical records  [id number]        Your Vitals Were     Pulse Temperature Pulse Oximetry BMI (Body Mass Index)          115 98.5  F (36.9  C) (Oral) 98% 27.32 kg/m2         Blood Pressure from Last 3  Encounters:   08/11/18 (!) 150/101   06/11/18 130/82   08/30/17 136/80    Weight from Last 3 Encounters:   08/11/18 197 lb 4.8 oz (89.5 kg)   06/11/18 202 lb (91.6 kg)   08/30/17 191 lb (86.6 kg)              Today, you had the following     No orders found for display       Primary Care Provider Office Phone # Fax #    [facility] [phone] [address] [facility] [address]        Equal Access to Services     [facility] : Hadii aad ku hadasho Soomaali, waaxda luqadaha, qaybta kaalmada adeegyaclarence, ashish hammond . So [facility] [phone].    ATENCIÓN: Si habla español, tiene a quintero disposición servicios gratuitos de asistencia lingüística. Ll[facility] [phone].    We comply with applicable federal civil rights laws and [place] laws. We do not discriminate on the basis of race, color, national origin, age, disability, sex, sexual orientation, or gender identity.            Thank you!     Thank you for choosing [facility]  for your care. Our goal is always to provide you with excellent care. Hearing back from our patients is one way we can continue to improve our services. Please take a few minutes to complete the written survey that you may receive in the mail after your visit with us. Thank you!             Your Updated Medication List - Protect others around you: Learn how to safely use, store and throw away your medicines at www.disposemymeds.org.          This list is accurate as of 8/11/18 10:12 AM.  Always use your most recent med list.                   Brand Name Dispense Instructions for use Diagnosis    metroNIDAZOLE 0.75 % topical gel    METROGEL    45 g    Apply topically 2 times daily    Rash and nonspecific skin eruption

## 2018-08-11 NOTE — PROGRESS NOTES
"TRIAGE: Ac Becerra is a 32 year old male that sustained a hand laceration installing cabinets this morning around 0700 causing a significant hand laceration across metacarpals, about 5 cm in length. Patient initially seen around 0700 at [facility], however left due to concern of insurance payment. States he was told \"insurance may not pay and you may have to pay out of pocket\". Patient presents with ongoing bleeding in injury and reports new onset numbness in forearm. Due to saturated dressing, wound explored, noted continuous bleeding with no hemostasis. Wound wrapped with full Kerlix gauze roll and coban for pressure. Patient advised to head directly to [facility] ED for further wound exploration and evaluation. Reports called to Charge DENNIS.     Anabelle Peguero, APRN, CNP    "

## 2018-08-15 ENCOUNTER — OFFICE VISIT (OUTPATIENT)
Dept: ORTHOPEDICS | Facility: CLINIC | Age: 33
End: 2018-08-15
Payer: COMMERCIAL

## 2018-08-15 VITALS — BODY MASS INDEX: 27.58 KG/M2 | WEIGHT: 197 LBS | HEIGHT: 71 IN

## 2018-08-15 DIAGNOSIS — S61.411A LACERATION OF RIGHT HAND WITHOUT FOREIGN BODY, INITIAL ENCOUNTER: Primary | ICD-10-CM

## 2018-08-15 NOTE — MR AVS SNAPSHOT
"              After Visit Summary   8/15/2018    Ac Becerra    MRN: [id number]           Patient Information     Date Of Birth          1985        Visit Information        Provider Department      8/15/2018 12:45 PM Rohan Webber MD [facility]         Follow-ups after your visit        Your next 10 appointments already scheduled     Aug 20, 2018 10:30 AM CDT   (Arrive by 10:15 AM)   RETURN HAND with Rohan Webber MD   [facility] ([facility])    [address] [place] [id number] [phone]              Who to contact     Please call your clinic at [phone] to:    Ask questions about your health    Make or cancel appointments    Discuss your medicines    Learn about your test results    Speak to your doctor            Additional Information About Your Visit        [facility] Information     [facility] gives you secure access to your electronic health record. If you see a primary care provider, you can also send messages to your care team and make appointments. If you have questions, please call your primary care clinic.  If you do not have a primary care provider, please call [phone] and they will assist you.      [facility] is an electronic gateway that provides easy, online access to your medical records. With [facility], you can request a clinic appointment, read your test results, renew a prescription or communicate with your care team.     To access your existing account, please contact your [facility] or call [phone] for assistance.        Care EveryWhere ID     This is your Care EveryWhere ID. This could be used by other organizations to access your [facility] medical records  [id number]        Your Vitals Were     Height BMI (Body Mass Index)                1.81 m (5' 11.25\") 27.28 kg/m2           Blood Pressure from Last 3 Encounters:   08/11/18 (!) 150/101   06/11/18 " 130/82   08/30/17 136/80    Weight from Last 3 Encounters:   08/15/18 89.4 kg (197 lb)   08/11/18 89.5 kg (197 lb 4.8 oz)   06/11/18 91.6 kg (202 lb)              Today, you had the following     No orders found for display       Primary Care Provider Office Phone # Fax #    [facility] [phone] [address] [facility] [address]        Equal Access to Services     YISEL CASE : Hadii aad ku hadasho Soomaali, waaxda luqadaha, qaybta kaalmada adeegyada, waxay idiin hayaan allison hammond . So [facility] [phone].    ATENCIÓN: Si habla español, tiene a quintero disposición servicios gratuitos de asistencia lingüística. Llame al [phone].    We comply with applicable federal civil rights laws and [place] laws. We do not discriminate on the basis of race, color, national origin, age, disability, sex, sexual orientation, or gender identity.            Thank you!     Thank you for choosing [facility]  for your care. Our goal is always to provide you with excellent care. Hearing back from our patients is one way we can continue to improve our services. Please take a few minutes to complete the written survey that you may receive in the mail after your visit with us. Thank you!             Your Updated Medication List - Protect others around you: Learn how to safely use, store and throw away your medicines at www.disposemymeds.org.          This list is accurate as of 8/15/18  1:50 PM.  Always use your most recent med list.                   Brand Name Dispense Instructions for use Diagnosis    metroNIDAZOLE 0.75 % topical gel    METROGEL    45 g    Apply topically 2 times daily    Rash and nonspecific skin eruption

## 2018-08-15 NOTE — LETTER
8/15/2018       RE: Ac Becerra  [address] [facility] [address]     Dear Colleague,    Thank you for referring your patient, Ac Becerra, to the [facility]. Please see a copy of my visit note below.    [facility]  Orthopedics  Rohan Webber MD  08/15/2018     Name: Ac Becerra  MRN: [id number]  Age: 32 year old  : 1985  Referring provider: Referred Self     Chief Complaint: Right dorsal hand laceration with concern for possible extensor tendon injury    Date of Injury:      History of Present Illness:   Ac Becerra is an otherwise healthy 32 RHD M with a right dorsal hand laceration sustained on 2018 while installing cabinets.  He was trying to open a cabinet door, when his hand hit against the inner metal rail and was gashed open.  He states the door had a rolled metal edge and no loose pieces that would have become dislodged, but it did cut him badly.  He immediately placed a pressure dressing on the wound and first presented to [facility], where he was then referred to [facility] in [place].  However, because of insurance reasons, he then went to [facility].  The emergency department there did not explore the wound.  They did irrigate the wound and placed 3 loose nylon stitches.  His tetanus was updated.  Per the patient, the emergency department provider did not look in the wound at all but told him that they had some concern for an extensor tendon injury based on location  of wound. They were unable to reach an on-call hand surgeon so they discharged the patient with instructions to follow-up with a hand surgeon this week for further evaluation.  He was placed in a dressing and a volar forearm splint, which he has kept clean, dry, and intact.  Denies any recent fevers or chills.  Denies any numbness, tingling, or motor weakness in his right upper extremity. Reports he can move all his fingers without  "difficulty. Of note, his son was born this morning.     Review of Systems:   A 14-point review of systems was obtained and is negative except for as noted in the HPI.     Answers for HPI/ROS submitted by the patient on 8/15/2018   General Symptoms: No  Skin Symptoms: No  HENT Symptoms: No  EYE SYMPTOMS: No  HEART SYMPTOMS: No  LUNG SYMPTOMS: No  INTESTINAL SYMPTOMS: No  URINARY SYMPTOMS: No  REPRODUCTIVE SYMPTOMS: No  SKELETAL SYMPTOMS: No  BLOOD SYMPTOMS: No  NERVOUS SYSTEM SYMPTOMS: No  MENTAL HEALTH SYMPTOMS: No    Medications:      metroNIDAZOLE (METROGEL) 0.75 % topical gel, Apply topically 2 times daily (Patient not taking: Reported on 2018), Disp: 45 g, Rfl: 11    Medications:   The patient denies any regular medication use.     Allergies:  The patient reports no known allergies.     Past Medical History:  The patient denies any significant past medical history.     Past Surgical History:  The patient does not have any pertinent past surgical history.    Social History:  Works as a custom . He denies tobacco use and admits to occasional alcohol use. Denies illicit drug use. Wife gave birth to a  baby this morning.  Lives in [place] with his family.      Family History:  Positive for diabetes mellitus and cancer.   Negative for bleeding or clotting disorders or adverse reactions to anesthesia.    Physical Examination:  Height 1.81 m (5' 11.25\"), weight 89.4 kg (197 lb).   GENERAL: Well-appearing, well-developed 32-year-old male.  No apparent distress.  Alert and oriented appropriately.  HEENT: Atraumatic, normocephalic.  RESPIRATORY: Breathing unlabored on room air.  CARDIOVASCULAR: Extremities warm and perfused.  2+ radial pulses bilaterally.  MUSCULOSKELETAL: Focused exam of the right upper extremity shows a 5 cm transverse deep laceration across the dorsum of the right hand.  3 nylon stitches are intact.Wound edges are loosely approximated.  No tendons or other dep structures are " visible. No gross contamination.  No surrounding swelling, erythema, or purulent drainage.  Achieves full active extension of the wrist and the MCP, PIP, and DIP joints of all digits. EDM and EIP fire independently. FDS and FDP are intact in all digits.  Fires EPL, FDL, and interossei with 5/5 strength.  Sensation is intact to light touch in median, radial, and ulnar nerve distributions.  Mildly tender in the dorsal distal forearm, but otherwise the right upper extremity is nontender to palpation throughout.    Imaging:   No imaging is available for review    Assessment:   32-year-old right-hand-dominant male with a right dorsal hand laceration sustained on 8/11/2018 while installing cabinets.  No clinical evidence for extensor tendon injury.     Plan:   We discussed with the patient that his clinical exam does not suggest a extensor tendon injury.  We did offer him the option of a wound exploration with irrigation debridement and primary closure in the operating room tomorrow to be completely sure that there is no partial tendon laceration. This could be done under local anesthesia only, with sedation reserved for if a tendon repair was needed. Without surgery, it is not possible to be sure that the tendons are uninjured. We also offered the option of leaving his wound as is and doing local wound care.  This would carry a potential risk of missing a tendon laceration that could propagate into a nonrepairable tendon injury in the future.  After discussing the risks and benefits and alternatives, he elected to proceed with conservative management and no surgery. His wound was cleaned and dressed and he was placed in a soft bulky dressing today in clinic. He will follow-up with us on Monday for a final check.     Seen and discussed with Dr. Webber, who agrees with the findings and plan as above    [facility]  Orthopaedic PGY4     UPON MY REVIEW AND AUTHENTICATION BY ELECTRONIC SIGNATURE, this confirms  (a) I performed the applicable clinical services, and (b) the record is accurate.      I, Rohan Webber, saw and evaluated this patient with the resident and agree with the resident s findings and plan of care as documented in the resident s note.         Again, thank you for allowing me to participate in the care of your patient.      Sincerely,    Rohan Webber MD

## 2018-08-15 NOTE — PROGRESS NOTES
[facility]  Orthopedics  Rohan Webber MD  08/15/2018     Name: Ac Becerra  MRN: [id number]  Age: 32 year old  : 1985  Referring provider: Referred Self     Chief Complaint: Right dorsal hand laceration with concern for possible extensor tendon injury    Date of Injury: 31     History of Present Illness:   Ac Becerra is an otherwise healthy 32 RHD M with a right dorsal hand laceration sustained on 2018 while installing cabinets.  He was trying to open a cabinet door, when his hand hit against the inner metal rail and was gashed open.  He states the door had a rolled metal edge and no loose pieces that would have become dislodged, but it did cut him badly.  He immediately placed a pressure dressing on the wound and first presented to [facility], where he was then referred to [facility] in [place].  However, because of insurance reasons, he then went to [facility].  The emergency department there did not explore the wound.  They did irrigate the wound and placed 3 loose nylon stitches.  His tetanus was updated.  Per the patient, the emergency department provider did not look in the wound at all but told him that they had some concern for an extensor tendon injury based on location  of wound. They were unable to reach an on-call hand surgeon so they discharged the patient with instructions to follow-up with a hand surgeon this week for further evaluation.  He was placed in a dressing and a volar forearm splint, which he has kept clean, dry, and intact.  Denies any recent fevers or chills.  Denies any numbness, tingling, or motor weakness in his right upper extremity. Reports he can move all his fingers without difficulty. Of note, his son was born this morning.     Review of Systems:   A 14-point review of systems was obtained and is negative except for as noted in the HPI.     Answers for HPI/ROS submitted by the patient on 8/15/2018   General Symptoms: No  Skin Symptoms: No  HENT  "Symptoms: No  EYE SYMPTOMS: No  HEART SYMPTOMS: No  LUNG SYMPTOMS: No  INTESTINAL SYMPTOMS: No  URINARY SYMPTOMS: No  REPRODUCTIVE SYMPTOMS: No  SKELETAL SYMPTOMS: No  BLOOD SYMPTOMS: No  NERVOUS SYSTEM SYMPTOMS: No  MENTAL HEALTH SYMPTOMS: No    Medications:      metroNIDAZOLE (METROGEL) 0.75 % topical gel, Apply topically 2 times daily (Patient not taking: Reported on 2018), Disp: 45 g, Rfl: 11    Medications:   The patient denies any regular medication use.     Allergies:  The patient reports no known allergies.     Past Medical History:  The patient denies any significant past medical history.     Past Surgical History:  The patient does not have any pertinent past surgical history.    Social History:  Works as a custom . He denies tobacco use and admits to occasional alcohol use. Denies illicit drug use. Wife gave birth to a  baby this morning.  Lives in [place] with his family.      Family History:  Positive for diabetes mellitus and cancer.   Negative for bleeding or clotting disorders or adverse reactions to anesthesia.    Physical Examination:  Height 1.81 m (5' 11.25\"), weight 89.4 kg (197 lb).   GENERAL: Well-appearing, well-developed 32-year-old male.  No apparent distress.  Alert and oriented appropriately.  HEENT: Atraumatic, normocephalic.  RESPIRATORY: Breathing unlabored on room air.  CARDIOVASCULAR: Extremities warm and perfused.  2+ radial pulses bilaterally.  MUSCULOSKELETAL: Focused exam of the right upper extremity shows a 5 cm transverse deep laceration across the dorsum of the right hand.  3 nylon stitches are intact.Wound edges are loosely approximated.  No tendons or other dep structures are visible. No gross contamination.  No surrounding swelling, erythema, or purulent drainage.  Achieves full active extension of the wrist and the MCP, PIP, and DIP joints of all digits. EDM and EIP fire independently. FDS and FDP are intact in all digits.  Fires EPL, FDL, and " interossei with 5/5 strength.  Sensation is intact to light touch in median, radial, and ulnar nerve distributions.  Mildly tender in the dorsal distal forearm, but otherwise the right upper extremity is nontender to palpation throughout.    Imaging:   No imaging is available for review    Assessment:   32-year-old right-hand-dominant male with a right dorsal hand laceration sustained on 8/11/2018 while installing cabinets.  No clinical evidence for extensor tendon injury.     Plan:   We discussed with the patient that his clinical exam does not suggest a extensor tendon injury.  We did offer him the option of a wound exploration with irrigation debridement and primary closure in the operating room tomorrow to be completely sure that there is no partial tendon laceration. This could be done under local anesthesia only, with sedation reserved for if a tendon repair was needed. Without surgery, it is not possible to be sure that the tendons are uninjured. We also offered the option of leaving his wound as is and doing local wound care.  This would carry a potential risk of missing a tendon laceration that could propagate into a nonrepairable tendon injury in the future.  After discussing the risks and benefits and alternatives, he elected to proceed with conservative management and no surgery. His wound was cleaned and dressed and he was placed in a soft bulky dressing today in clinic. He will follow-up with us on Monday for a final check.     Seen and discussed with Dr. Webber, who agrees with the findings and plan as above    [facility]  [facility]edic [facility]Y4     UPON MY REVIEW AND AUTHENTICATION BY ELECTRONIC SIGNATURE, this confirms (a) I performed the applicable clinical services, and (b) the record is accurate.      I, Rohan Webber, saw and evaluated this patient with the resident and agree with the resident s findings and plan of care as documented in the resident s note.

## 2018-08-15 NOTE — NURSING NOTE
"Reason For Visit:   Chief Complaint   Patient presents with     Right Hand - WOUND CARE     Laceration DOI 8/11/18 while installing cabinets       Primary MD: Dwayne Dolan [place]  Ref. MD: Self    Age: 32 year old    ?  No      Ht 1.81 m (5' 11.25\")  Wt 89.4 kg (197 lb)  BMI 27.28 kg/m2      Pain Assessment  Patient Currently in Pain: Denies    Hand Dominance Evaluation  Hand Dominance: Right          QuickDASH Assessment  No flowsheet data found.       Current Outpatient Prescriptions   Medication Sig Dispense Refill     metroNIDAZOLE (METROGEL) 0.75 % topical gel Apply topically 2 times daily (Patient not taking: Reported on 8/11/2018) 45 g 11       No Known Allergies    Darryl Manley ATC  "

## 2018-08-20 ENCOUNTER — OFFICE VISIT (OUTPATIENT)
Dept: FAMILY MEDICINE | Facility: CLINIC | Age: 33
End: 2018-08-20
Payer: COMMERCIAL

## 2018-08-20 VITALS
WEIGHT: 200 LBS | HEART RATE: 101 BPM | DIASTOLIC BLOOD PRESSURE: 100 MMHG | RESPIRATION RATE: 16 BRPM | BODY MASS INDEX: 27.7 KG/M2 | SYSTOLIC BLOOD PRESSURE: 158 MMHG | OXYGEN SATURATION: 99 % | TEMPERATURE: 98.2 F

## 2018-08-20 DIAGNOSIS — S61.411D LACERATION OF RIGHT HAND WITHOUT FOREIGN BODY, SUBSEQUENT ENCOUNTER: Primary | ICD-10-CM

## 2018-08-20 PROCEDURE — 99214 OFFICE O/P EST MOD 30 MIN: CPT | Performed by: NURSE PRACTITIONER

## 2018-08-20 RX ORDER — GINSENG 100 MG
1 CAPSULE ORAL 2 TIMES DAILY
Qty: 30 G | Refills: 1 | Status: SHIPPED | OUTPATIENT
Start: 2018-08-20 | End: 2019-03-17

## 2018-08-20 RX ORDER — AMOXICILLIN AND CLAVULANATE POTASSIUM 500; 125 MG/1; MG/1
TABLET, FILM COATED ORAL
COMMUNITY
Start: 2008-04-21 | End: 2022-07-28

## 2018-08-20 ASSESSMENT — PAIN SCALES - GENERAL: PAINLEVEL: NO PAIN (0)

## 2018-08-20 NOTE — PROGRESS NOTES
SUBJECTIVE:   Ac Becerra is a 32 year old male who presents to clinic today for the following health issues:    Concern: Patient was seen on 8/1//2018 at emergency room for laceration that occurred while working on cabinetry.  Occurred on dorsal aspect of left hand.  In emergency room, 3 loose nylon sutures were placed.  No imaging was done.  Patient then saw hand surgeon at [facility] on 8/15/18 who offered surgical exploration to evaluate for tendon damage which patient declined.  He prefers conservative management.    Patient took Augmentin x 10 days.  Denies erythema surrounding the wound, foul-smelling discharge, chills, or fever.      He is here today for removal of the sutures and advice on wound care.  Currently changing the gauze daily.  Feels it is getting better but the wound is still open.  Does note that he has some numbness and tingling along his right dorsal forearm.  Previously this was painful.  Denies weakness.    Problem list and histories reviewed & adjusted, as indicated.  Additional history: as documented    Patient Active Problem List   Diagnosis     Right groin pain     Rash and nonspecific skin eruption     Blood in semen     Elevated cholesterol     Glucosuria     Laceration of right hand without foreign body, subsequent encounter     Past Surgical History:   Procedure Laterality Date     NO HISTORY OF SURGERY         Social History   Substance Use Topics     Smoking status: Never Smoker     Smokeless tobacco: Never Used     Alcohol use Yes      Comment: occ     Family History   Problem Relation Age of Onset     No Known Problems Mother      No Known Problems Father      unknown     No Known Problems Brother      Diabetes Maternal Grandmother      Cancer Maternal Grandfather      throat; cancer     No Known Problems Brother          Current Outpatient Prescriptions   Medication Sig Dispense Refill     amoxicillin-clavulanate (AUGMENTIN) 500-125 MG per tablet        bacitracin 500 UNIT/GM  OINT Apply 1 g topically 2 times daily 30 g 1     metroNIDAZOLE (METROGEL) 0.75 % topical gel Apply topically 2 times daily (Patient not taking: Reported on 8/11/2018) 45 g 11     No Known Allergies  BP Readings from Last 3 Encounters:   08/20/18 (!) 158/100   08/11/18 (!) 150/101   06/11/18 130/82    Wt Readings from Last 3 Encounters:   08/20/18 200 lb (90.7 kg)   08/15/18 197 lb (89.4 kg)   08/11/18 197 lb 4.8 oz (89.5 kg)            Reviewed and updated as needed this visit by clinical staff  Tobacco  Allergies  Meds  Med Hx  Surg Hx  Fam Hx  Soc Hx      Reviewed and updated as needed this visit by Provider  Tobacco  Meds  Med Hx  Surg Hx  Fam Hx  Soc Hx        ROS:  Constitutional, HEENT, cardiovascular, pulmonary, gi and gu systems are negative, except as otherwise noted.    OBJECTIVE:     BP (!) 158/100 (BP Location: Right arm, Patient Position: Chair, Cuff Size: Adult Regular)  Pulse 101  Temp 98.2  F (36.8  C) (Oral)  Resp 16  Wt 200 lb (90.7 kg)  SpO2 99%  BMI 27.7 kg/m2  Body mass index is 27.7 kg/(m^2).  GENERAL: healthy, alert and no distress  MS: 5 cm transverse deep laceration across the dorsum of the right hand.  3 nylon stitches are intact.Wound edges are loosely approximated.  No tendons or other dep structures are visible. No debris noted.  No surrounding swelling, erythema, or purulent drainage.  Achieves full active extension of the wrist and the MCP, PIP, and DIP joints of all digits, CMS intact of all digits  NEURO: Normal strength and tone, mentation intact and speech normal    Diagnostic Test Results:  none     ASSESSMENT/PLAN:     1. Laceration of right hand without foreign body, subsequent encounter  Removed 3 sutures today.  Tolerated well.  Wound healing well.  No signs of infection.  Discussed healing by second intention as the wound was not originally approximated.  He understands this and the need to keep it clean and protected.  We discussed changing dressing twice a  day as per patient instructions.  I still advised he follow up with hand surgery given that he has some continued numbness of forearm.  He was not happy with hand surgeon at [facility] in [place] so new referral was entered. Patient hoping to be seen closer to home.  Return and emergency room precautions discussed.    - ORTHO  REFERRAL  - bacitracin 500 UNIT/GM OINT; Apply 1 g topically 2 times daily  Dispense: 30 g; Refill: 1    Patient Instructions     For wound:  -clean with normal tap water, avoid soap.    -pat dry  -apply bacitracin to site  -place non-adherent gauze pad on site  -place 4x4 2-3 gauze pads on top for padding  -wrap with coban    Follow-up hand surgeon in 1-2 weeks.    Follow-up sooner  if you notice increasing redness around site, pain, any yellow drainage, or odor.    At [facility], we strive to deliver an exceptional experience to you, every time we see you.  If you receive a survey in the mail, please send us back your thoughts. We really do value your feedback.    Based on your medical history, these are the current health maintenance/preventive care services that you are due for (some may have been done at this visit.)  Health Maintenance Due   Topic Date Due     HIV SCREEN (SYSTEM ASSIGNED)  08/22/2004     PHQ-2 Q1 YR  08/30/2018       Suggested websites for health information:  Www.[facility].org : Up to date and easily searchable information on multiple topics.  Www.medlineplus.gov : medication info, interactive tutorials, watch real surgeries online  Www.familydoctor.org : good info from the Academy of Family Physicians  Www.cdc.gov : public health info, travel advisories, epidemics (H1N1)  Www.aap.org : children's health info, normal development, vaccinations  Www.health.state.[place].us : [place] dept of health, public health issues in [place], N1N1    Your care team:                            Family Medicine Internal Medicine   MD Mil Coyle MD    MD Alma Khan PA-C, MD Candace Del Rio CNP, PA-C Brianna Koopman, MD Sabina Mc APRN CNP   MD Gabriela Red MD Deborah Mielke, MD Kim Thein, APRN [facility]      Clinic hours: Monday - Thursday 7 am-7 pm; Fridays 7 am-5 pm.   Urgent care: Monday - Friday 11 am-9 pm; Saturday and Sunday 9 am-5 pm.  Pharmacy : Monday -Thursday 8 am-8 pm; Friday 8 am-6 pm; Saturday and Sunday 9 am-5 pm.     Clinic: [phone]   Pharmacy: [phone]          Georgia Mackay, AMIRA BARTON  [facility]

## 2018-08-20 NOTE — PATIENT INSTRUCTIONS
For wound:  -clean with normal tap water, avoid soap.    -pat dry  -apply bacitracin to site  -place non-adherent gauze pad on site  -place 4x4 2-3 gauze pads on top for padding  -wrap with coban    Follow-up hand surgeon in 1-2 weeks.    Follow-up sooner  if you notice increasing redness around site, pain, any yellow drainage, or odor.    At [facility], we strive to deliver an exceptional experience to you, every time we see you.  If you receive a survey in the mail, please send us back your thoughts. We really do value your feedback.    Based on your medical history, these are the current health maintenance/preventive care services that you are due for (some may have been done at this visit.)  Health Maintenance Due   Topic Date Due     HIV SCREEN (SYSTEM ASSIGNED)  08/22/2004     PHQ-2 Q1 YR  08/30/2018       Suggested websites for health information:  Www.[employer] : Up to date and easily searchable information on multiple topics.  Www.medlineplus.gov : medication info, interactive tutorials, watch real surgeries online  Www.familydoctor.org : good info from the Academy of Family Physicians  Www.cdc.gov : public health info, travel advisories, epidemics (H1N1)  Www.aap.org : children's health info, normal development, vaccinations  Www.health.state.[place] : [place] dept of health, public health issues in [place], N1N1    Your care team:                            Family Medicine Internal Medicine   MD Mil Coyle MD Shantel Branch-Fleming, MD Katya Georgiev PA-C Megan Hill, AMIRA Aguilera MD Pediatrics   REFUGIO Villa, MD Sabina Mc APRN CNP   MD Gabriela Red MD Deborah Mielke, MD Kim Thein, APRN [facility]      Clinic hours: Monday - Thursday 7 am-7 pm; Fridays 7 am-5 pm.   Urgent care: Monday - Friday 11 am-9 pm; Saturday and Sunday 9 am-5 pm.  Pharmacy : Monday -Thursday 8 am-8 pm; Friday 8 am-6 pm;  Saturday and Sunday 9 am-5 pm.     Clinic: [phone]   Pharmacy: [phone]

## 2018-08-20 NOTE — MR AVS SNAPSHOT
After Visit Summary   8/20/2018    Ac Becerra    MRN: [id number]           Patient Information     Date Of Birth          1985        Visit Information        Provider Department      8/20/2018 10:20 AM Georgia Mackay APRN CNP [facility]        Today's Diagnoses     Laceration of right hand without foreign body, subsequent encounter    -  1      Care Instructions    For wound:  -clean with normal tap water, avoid soap.    -pat dry  -apply bacitracin to site  -place non-adherent gauze pad on site  -place 4x4 2-3 gauze pads on top for padding  -wrap with coban    Follow-up hand surgeon in 1-2 weeks.    Follow-up sooner  if you notice increasing redness around site, pain, any yellow drainage, or odor.    At [facility], we strive to deliver an exceptional experience to you, every time we see you.  If you receive a survey in the mail, please send us back your thoughts. We really do value your feedback.    Based on your medical history, these are the current health maintenance/preventive care services that you are due for (some may have been done at this visit.)  Health Maintenance Due   Topic Date Due     HIV SCREEN (SYSTEM ASSIGNED)  08/22/2004     PHQ-2 Q1 YR  08/30/2018       Suggested websites for health information:  Www.[employer] : Up to date and easily searchable information on multiple topics.  Www.medlineplus.gov : medication info, interactive tutorials, watch real surgeries online  Www.familydoctor.org : good info from the Academy of Family Physicians  Www.cdc.gov : public health info, travel advisories, epidemics (H1N1)  Www.aap.org : children's health info, normal development, vaccinations  Www.health.state.[place].us : [place] dept of health, public health issues in [place], N1N1    Your care team:                            Family Medicine Internal Medicine   MD Mil Coyle MD Shantel Branch-Fleming, MD Katya Georgiev  REFUGIO Steiner, APRN CNP    Misha Aguilera MD Pediatrics   Omero Silver, REFUGIO Mackay, CNP MD Sabina Farooq APRN CNP   MD Gabriela Red MD Deborah Mielke, MD Kim Thein, APRN [facility]      Clinic hours: Monday - Thursday 7 am-7 pm; Fridays 7 am-5 pm.   Urgent care: Monday - Friday 11 am-9 pm; Saturday and Sunday 9 am-5 pm.  Pharmacy : Monday -Thursday 8 am-8 pm; Friday 8 am-6 pm; Saturday and Sunday 9 am-5 pm.     Clinic: [phone]   Pharmacy: [phone]              Follow-ups after your visit        Additional Services     ORTHO  REFERRAL       [facility] is referring you to the Orthopedic  Services at [facility].       The  Representative will assist you in the coordination of your Orthopedic and Musculoskeletal Care as prescribed by your physician.    The  Representative will call you within 1 business day to help schedule your appointment, or you may contact the  Representative at:    All areas ~ [phone]     Type of Referral : Surgical / Specialist HAND SURGEON      Timeframe requested: 3 - 5 days    Coverage of these services is subject to the terms and limitations of your health insurance plan.  Please call member services at your health plan with any benefit or coverage questions.      If X-rays, CT or MRI's have been performed, please contact the facility where they were done to arrange for , prior to your scheduled appointment.  Please bring this referral request to your appointment and present it to your specialist.                  Who to contact     If you have questions or need follow up information about today's clinic visit or your schedule please contact [facility]ILLIPS directly at [phone].  Normal or non-critical lab and imaging results will be communicated to you by MyChart, letter or phone within 4 business days after the clinic has  received the results. If you do not hear from us within 7 days, please contact the clinic through [employer] or phone. If you have a critical or abnormal lab result, we will notify you by phone as soon as possible.  Submit refill requests through [employer] or call your pharmacy and they will forward the refill request to us. Please allow 3 business days for your refill to be completed.          Additional Information About Your Visit        [employer] Information     [employer] gives you secure access to your electronic health record. If you see a primary care provider, you can also send messages to your care team and make appointments. If you have questions, please call your primary care clinic.  If you do not have a primary care provider, please call [phone] and they will assist you.        Care EveryWhere ID     This is your Care EveryWhere ID. This could be used by other organizations to access your [place] medical records  [id number]        Your Vitals Were     Pulse Temperature Respirations Pulse Oximetry BMI (Body Mass Index)       101 98.2  F (36.8  C) (Oral) 16 99% 27.7 kg/m2        Blood Pressure from Last 3 Encounters:   08/20/18 (!) 158/100   08/11/18 (!) 150/101   06/11/18 130/82    Weight from Last 3 Encounters:   08/20/18 200 lb (90.7 kg)   08/15/18 197 lb (89.4 kg)   08/11/18 197 lb 4.8 oz (89.5 kg)              We Performed the Following     ORTHO  REFERRAL          Today's Medication Changes          These changes are accurate as of 8/20/18 11:04 AM.  If you have any questions, ask your nurse or doctor.               Start taking these medicines.        Dose/Directions    bacitracin 500 UNIT/GM Oint   Used for:  Laceration of right hand without foreign body, subsequent encounter   Started by:  Georgia Mackay APRN CNP        Dose:  1 g   Apply 1 g topically 2 times daily   Quantity:  30 g   Refills:  1            Where to get your medicines      These medications were sent to [facility]  [address] [address] [employer] - [place] - [address]  [address], [place] [address]     Phone:  [phone]     bacitracin 500 UNIT/GM Oint                Primary Care Provider Office Phone # Fax #    [facility] [phone] [address] [facility] [address]        Equal Access to Services     [facility] : Hadii aad ku hadasho Soomaali, waaxda luqadaha, qaybta kaalmada adeegyada, waxay idiin hayaan adeeg kharash la'aan ah. So [facility] [place] [phone].    ATENCIÓN: Si habla español, tiene a quintero disposición servicios gratuitos de asistencia lingüística. Kuledepgurpreet al [phone].    We comply with applicable federal civil rights laws and [place] laws. We do not discriminate on the basis of race, color, national origin, age, disability, sex, sexual orientation, or gender identity.            Thank you!     Thank you for choosing [facility]  for your care. Our goal is always to provide you with excellent care. Hearing back from our patients is one way we can continue to improve our services. Please take a few minutes to complete the written survey that you may receive in the mail after your visit with us. Thank you!             Your Updated Medication List - Protect others around you: Learn how to safely use, store and throw away your medicines at www.disposemymeds.org.          This list is accurate as of 8/20/18 11:04 AM.  Always use your most recent med list.                   Brand Name Dispense Instructions for use Diagnosis    AUGMENTIN 500-125 MG per tablet   Generic drug:  amoxicillin-clavulanate           bacitracin 500 UNIT/GM Oint     30 g    Apply 1 g topically 2 times daily    Laceration of right hand without foreign body, subsequent encounter       metroNIDAZOLE 0.75 % topical gel    METROGEL    45 g    Apply topically 2 times daily    Rash and nonspecific skin eruption

## 2019-03-08 ENCOUNTER — OFFICE VISIT (OUTPATIENT)
Dept: FAMILY MEDICINE | Facility: OTHER | Age: 34
End: 2019-03-08

## 2019-03-08 ENCOUNTER — ANCILLARY PROCEDURE (OUTPATIENT)
Dept: GENERAL RADIOLOGY | Facility: OTHER | Age: 34
End: 2019-03-08
Attending: FAMILY MEDICINE

## 2019-03-08 VITALS
SYSTOLIC BLOOD PRESSURE: 167 MMHG | OXYGEN SATURATION: 99 % | HEART RATE: 81 BPM | DIASTOLIC BLOOD PRESSURE: 87 MMHG | TEMPERATURE: 97.6 F | RESPIRATION RATE: 18 BRPM

## 2019-03-08 DIAGNOSIS — R42 DIZZINESS: ICD-10-CM

## 2019-03-08 DIAGNOSIS — R07.89 CHEST TIGHTNESS: Primary | ICD-10-CM

## 2019-03-08 LAB
ANION GAP SERPL CALCULATED.3IONS-SCNC: 7 MMOL/L (ref 3–14)
BASOPHILS # BLD AUTO: 0 10E9/L (ref 0–0.2)
BASOPHILS NFR BLD AUTO: 0.3 %
BUN SERPL-MCNC: 15 MG/DL (ref 7–30)
CALCIUM SERPL-MCNC: 9 MG/DL (ref 8.5–10.1)
CHLORIDE SERPL-SCNC: 103 MMOL/L (ref 94–109)
CO2 SERPL-SCNC: 27 MMOL/L (ref 20–32)
CREAT SERPL-MCNC: 0.99 MG/DL (ref 0.66–1.25)
DIFFERENTIAL METHOD BLD: NORMAL
EOSINOPHIL # BLD AUTO: 0.2 10E9/L (ref 0–0.7)
EOSINOPHIL NFR BLD AUTO: 2.8 %
ERYTHROCYTE [DISTWIDTH] IN BLOOD BY AUTOMATED COUNT: 12.9 % (ref 10–15)
GFR SERPL CREATININE-BSD FRML MDRD: >90 ML/MIN/{1.73_M2}
GLUCOSE SERPL-MCNC: 133 MG/DL (ref 70–99)
HCT VFR BLD AUTO: 47 % (ref 40–53)
HGB BLD-MCNC: 16.3 G/DL (ref 13.3–17.7)
LYMPHOCYTES # BLD AUTO: 1.7 10E9/L (ref 0.8–5.3)
LYMPHOCYTES NFR BLD AUTO: 26.4 %
MCH RBC QN AUTO: 31.2 PG (ref 26.5–33)
MCHC RBC AUTO-ENTMCNC: 34.7 G/DL (ref 31.5–36.5)
MCV RBC AUTO: 90 FL (ref 78–100)
MONOCYTES # BLD AUTO: 0.5 10E9/L (ref 0–1.3)
MONOCYTES NFR BLD AUTO: 8.2 %
NEUTROPHILS # BLD AUTO: 3.9 10E9/L (ref 1.6–8.3)
NEUTROPHILS NFR BLD AUTO: 62.3 %
PLATELET # BLD AUTO: 198 10E9/L (ref 150–450)
POTASSIUM SERPL-SCNC: 4.6 MMOL/L (ref 3.4–5.3)
RBC # BLD AUTO: 5.23 10E12/L (ref 4.4–5.9)
SODIUM SERPL-SCNC: 137 MMOL/L (ref 133–144)
WBC # BLD AUTO: 6.3 10E9/L (ref 4–11)

## 2019-03-08 PROCEDURE — 99214 OFFICE O/P EST MOD 30 MIN: CPT | Performed by: FAMILY MEDICINE

## 2019-03-08 PROCEDURE — 71046 X-RAY EXAM CHEST 2 VIEWS: CPT

## 2019-03-08 PROCEDURE — 93000 ELECTROCARDIOGRAM COMPLETE: CPT | Performed by: FAMILY MEDICINE

## 2019-03-08 PROCEDURE — 85025 COMPLETE CBC W/AUTO DIFF WBC: CPT | Performed by: FAMILY MEDICINE

## 2019-03-08 PROCEDURE — 36415 COLL VENOUS BLD VENIPUNCTURE: CPT | Performed by: FAMILY MEDICINE

## 2019-03-08 PROCEDURE — 80048 BASIC METABOLIC PNL TOTAL CA: CPT | Performed by: FAMILY MEDICINE

## 2019-03-08 RX ORDER — MECLIZINE HYDROCHLORIDE 25 MG/1
25 TABLET ORAL 3 TIMES DAILY PRN
Qty: 20 TABLET | Refills: 0 | Status: SHIPPED | OUTPATIENT
Start: 2019-03-08 | End: 2022-12-28

## 2019-03-08 ASSESSMENT — PAIN SCALES - GENERAL: PAINLEVEL: NO PAIN (0)

## 2019-03-08 NOTE — PROGRESS NOTES
SUBJECTIVE:   Ac Becerra is a 33 year old male who presents to clinic today for the following health issues:    HPI  CHEST PAIN     Onset: last night    Description:   Location:  Upper chest   Character: tightness  Radiation: none  Duration: waxing and waning     Intensity: 0/10    Progression of Symptoms:  same    Accompanying Signs & Symptoms:  Shortness of breath: yes initially has resolved  Sweating: no   Nausea/vomiting: no   Lightheadedness: YES  Palpitations: no  Fever/Chills: no - but having shaking episodes  Cough: no   Heartburn: no     History:   Family history of heart disease no   Tobacco use: no     Precipitating factors:   Worse with exertion: no   Worse with deep breaths :  no   Related to food: no     Alleviating factors:  none       Therapies Tried and outcome: came to clinic this morning    Patient presented to the clinic feeling lightheaded/dizzy. Stated last night started having upper chest tightness, with shortness of breath. Tightness feels like he just worked out but has not worked out. Stated has been shoveling with snowfalls. Lightheadedness feels like wobbly sensation, off balanced. EKGs completed in clinic, TC evaluated patient, labs completed (BMP, CBC), CXR completed. Patient had periodic full shaking episodes, denies feeling cold. Did apply blanket to patient to decrease shaking episodes. At times will feel confused but knows self and surroundings. Lightheadedness is worse with head movement, improves with being still or laying down. Has intermittent diarrhea with caffeine use. Wearing contacts, feels like eyes are dry, difficulty reading signs in room that he can normally see clearly. Denies pain, breathing concerns, nausea, vomiting, tingling/numbness, headaches, cough, sore throat, ear pain, visual changes    Problem list and histories reviewed & adjusted, as indicated.  Additional history: as documented    Patient Active Problem List   Diagnosis     Right groin pain     Rash  and nonspecific skin eruption     Blood in semen     Elevated cholesterol     Glucosuria     Laceration of right hand without foreign body, subsequent encounter     Past Surgical History:   Procedure Laterality Date     NO HISTORY OF SURGERY         Social History     Tobacco Use     Smoking status: Never Smoker     Smokeless tobacco: Never Used   Substance Use Topics     Alcohol use: Yes     Comment: occ     Family History   Problem Relation Age of Onset     No Known Problems Mother      No Known Problems Father         unknown     No Known Problems Brother      Diabetes Maternal Grandmother      Cancer Maternal Grandfather         throat; cancer     No Known Problems Brother            ROS:  CONSTITUTIONAL: NEGATIVE for fever, chills, change in weight  ENT/MOUTH: NEGATIVE for ear, mouth and throat problems  RESP: Feels chest is uncomfortable he is reluctant to call it pain or tightness of.  CV: NEGATIVE for chest pain, palpitations or peripheral edema   Neuro: Notes that he feels dizzy when sitting up or moving his head.  This is unusual for him.    OBJECTIVE:     /87 (BP Location: Left arm, Patient Position: Supine, Cuff Size: Adult Regular)   Pulse 81   Temp 97.6  F (36.4  C) (Oral)   Resp 18   SpO2 99%   There is no height or weight on file to calculate BMI.  Gen: no apparent distress  HENT: Ears normal. Throat and pharynx normal. Neck supple. No adenopathy or masses in the neck or supraclavicular regions. Sinuses non tender.   Chest: clear to auscultation without wheeze, rale or rhonchi, no chest wall tenderness  Cor: regular rate and rhythm without murmur  ABDOMEN: soft, nontender, no masses and bowel sounds normal  Ext: warm and dry without edema  Neuro: Patient will be lying and then his whole body will just shake.  He states that he has no control over this.  He states this does not happen on a usual basis for him but sometimes he feels a little shaky when he is anxious.  Psych: Alert and  oriented times 3; coherent speech, normal   rate and volume, able to articulate logical thoughts, able   to abstract reason, no tangential thoughts, no hallucinations   or delusions  His affect is slightly anxious    CXR: no obvious infiltrate, radiology review pending    Results for orders placed or performed in visit on 03/08/19   XR Chest 2 Views    Narrative    CHEST TWO VIEWS   3/8/2019 9:49 AM     HISTORY: Chest tightness.    COMPARISON: None.      Impression    IMPRESSION: No acute cardiopulmonary disease.    DERRELL MAE MD   **Basic metabolic panel FUTURE anytime   Result Value Ref Range    Sodium 137 133 - 144 mmol/L    Potassium 4.6 3.4 - 5.3 mmol/L    Chloride 103 94 - 109 mmol/L    Carbon Dioxide 27 20 - 32 mmol/L    Anion Gap 7 3 - 14 mmol/L    Glucose 133 (H) 70 - 99 mg/dL    Urea Nitrogen 15 7 - 30 mg/dL    Creatinine 0.99 0.66 - 1.25 mg/dL    GFR Estimate >90 >60 mL/min/[1.73_m2]    GFR Estimate If Black >90 >60 mL/min/[1.73_m2]    Calcium 9.0 8.5 - 10.1 mg/dL   CBC with platelets and differential   Result Value Ref Range    WBC 6.3 4.0 - 11.0 10e9/L    RBC Count 5.23 4.4 - 5.9 10e12/L    Hemoglobin 16.3 13.3 - 17.7 g/dL    Hematocrit 47.0 40.0 - 53.0 %    MCV 90 78 - 100 fl    MCH 31.2 26.5 - 33.0 pg    MCHC 34.7 31.5 - 36.5 g/dL    RDW 12.9 10.0 - 15.0 %    Platelet Count 198 150 - 450 10e9/L    % Neutrophils 62.3 %    % Lymphocytes 26.4 %    % Monocytes 8.2 %    % Eosinophils 2.8 %    % Basophils 0.3 %    Absolute Neutrophil 3.9 1.6 - 8.3 10e9/L    Absolute Lymphocytes 1.7 0.8 - 5.3 10e9/L    Absolute Monocytes 0.5 0.0 - 1.3 10e9/L    Absolute Eosinophils 0.2 0.0 - 0.7 10e9/L    Absolute Basophils 0.0 0.0 - 0.2 10e9/L    Diff Method Automated Method         ASSESSMENT/PLAN:     1. Chest tightness  Normal chest x-ray and normal lung exam.  He does not feel wheezy.  This could be related to anxiety or he could be developing a viral infection.    - XR Chest 2 Views  - **Basic metabolic panel FUTURE  anytime  - CBC with platelets and differential    2. Dizziness  His main concern has been his dizziness a.  Appears to be positional in nature.  Basic metabolic panel was drawn.  Will try meclizine to use as needed.  I have recommended he return to his primary care provider next week for further evaluation.  His shakes had stopped by the end of our visit.  However we discussed that if he has more spells like this I would recommend he present to the emergency department for further evaluation.  - meclizine (ANTIVERT) 25 MG tablet; Take 1 tablet (25 mg) by mouth 3 times daily as needed for dizziness  Dispense: 20 tablet; Refill: 0      Sofia Moore MD  [facility]

## 2019-06-19 NOTE — PROGRESS NOTES
SUBJECTIVE:  Ac Becerra, a 31 year old male scheduled an appointment to discuss the following issues:  Rash right cheek. left side ok.   No history ezcema. No trauma. No changes in soaps.   No changes in activity or new skin contacts. Not painful.  Mild itching. A little spreading. No fevers or chills. No creams.   No rashes elsewhere. No similar issues. No fevers or chills.   right eye stye past year. No pus. Initially painful - not pain now. No blurry vision.  Non-smoker. No regular ALCOHOL. Emotionally ok.   No past medical history on file.    No past surgical history on file.    No family history on file.    Social History   Substance Use Topics     Smoking status: Never Smoker     Smokeless tobacco: Not on file     Alcohol use Not on file       ROS:  \  OBJECTIVE:  /80  Pulse 71  Temp 97.1  F (36.2  C) (Oral)  Ht 6' (1.829 m)  Wt 188 lb (85.3 kg)  SpO2 98%  BMI 25.5 kg/m2  EXAM:  GENERAL APPEARANCE: healthy, alert and no distress  EYES: EOMI,  PERRL  EYES: pea sized non-tender mobile superficial lesion right lateral lower eyelid  HENT: ear canals and TM's normal and nose and mouth without ulcers or lesions  NECK: no adenopathy, no asymmetry, masses, or scars and thyroid normal to palpation  RESP: lungs clear to auscultation - no rales, rhonchi or wheezes  CV: regular rates and rhythm, normal S1 S2, no S3 or S4 and no murmur, click or rub -  ABDOMEN:  soft, nontender, no HSM or masses and bowel sounds normal  MS: extremities normal- no gross deformities noted, no evidence of inflammation in joints, FROM in all extremities.  SKIN: red papular rash right upper check. No obviously at hair follicles. No warmth. Non-tender.  PSYCH: mentation appears normal and affect normal/bright    ASSESSMENT / PLAN:  (R21) Rash  (primary encounter diagnosis)  Comment: likely dermatitis (vs low grade folliculitis?)  Plan: DERMATOLOGY REFERRAL, triamcinolone (KENALOG)         0.1 % cream        Reveiwed risks and side  effects of medication  Consider antibiotic if worse or follow-up dermatology if persists. Expected course and warning signs reviewed. Call/email with questions/concerns     (H02.9) Eyelid lesion  Comment: likely chalazion   Plan: OPHTHALMOLOGY ADULT REFERRAL        Follow-up eye md if interested in removal or grows in size/pain/visual changes. Expected course and warning signs reviewed. Call/email with questions/concerns     Quincy Mathew     hx  hip pain worst when walking uses cane when walking

## 2020-02-23 ENCOUNTER — HEALTH MAINTENANCE LETTER (OUTPATIENT)
Age: 35
End: 2020-02-23

## 2020-12-12 ENCOUNTER — HEALTH MAINTENANCE LETTER (OUTPATIENT)
Age: 35
End: 2020-12-12

## 2021-04-11 ENCOUNTER — HEALTH MAINTENANCE LETTER (OUTPATIENT)
Age: 36
End: 2021-04-11

## 2021-09-26 ENCOUNTER — HEALTH MAINTENANCE LETTER (OUTPATIENT)
Age: 36
End: 2021-09-26

## 2022-05-08 ENCOUNTER — HEALTH MAINTENANCE LETTER (OUTPATIENT)
Age: 37
End: 2022-05-08

## 2022-07-28 ENCOUNTER — OFFICE VISIT (OUTPATIENT)
Dept: FAMILY MEDICINE | Facility: OTHER | Age: 37
End: 2022-07-28
Payer: COMMERCIAL

## 2022-07-28 ENCOUNTER — ALLIED HEALTH/NURSE VISIT (OUTPATIENT)
Dept: FAMILY MEDICINE | Facility: OTHER | Age: 37
End: 2022-07-28
Payer: COMMERCIAL

## 2022-07-28 VITALS
HEART RATE: 104 BPM | SYSTOLIC BLOOD PRESSURE: 158 MMHG | RESPIRATION RATE: 22 BRPM | OXYGEN SATURATION: 100 % | DIASTOLIC BLOOD PRESSURE: 88 MMHG | TEMPERATURE: 98.2 F

## 2022-07-28 VITALS
WEIGHT: 201 LBS | OXYGEN SATURATION: 98 % | BODY MASS INDEX: 27.84 KG/M2 | HEART RATE: 70 BPM | TEMPERATURE: 97.9 F | DIASTOLIC BLOOD PRESSURE: 62 MMHG | SYSTOLIC BLOOD PRESSURE: 128 MMHG

## 2022-07-28 DIAGNOSIS — Z11.59 NEED FOR HEPATITIS C SCREENING TEST: ICD-10-CM

## 2022-07-28 DIAGNOSIS — Z11.4 SCREENING FOR HIV (HUMAN IMMUNODEFICIENCY VIRUS): ICD-10-CM

## 2022-07-28 DIAGNOSIS — R42 DIZZINESS: ICD-10-CM

## 2022-07-28 DIAGNOSIS — Z78.9 TRIAGE ASSESSMENT CLASS 3, NONURGENT: Primary | ICD-10-CM

## 2022-07-28 DIAGNOSIS — M25.561 ACUTE PAIN OF RIGHT KNEE: Primary | ICD-10-CM

## 2022-07-28 LAB
ALBUMIN SERPL-MCNC: 4.7 G/DL (ref 3.4–5)
ALP SERPL-CCNC: 96 U/L (ref 40–150)
ALT SERPL W P-5'-P-CCNC: 25 U/L (ref 0–70)
ANION GAP SERPL CALCULATED.3IONS-SCNC: 6 MMOL/L (ref 3–14)
AST SERPL W P-5'-P-CCNC: 17 U/L (ref 0–45)
BILIRUB SERPL-MCNC: 0.9 MG/DL (ref 0.2–1.3)
BUN SERPL-MCNC: 14 MG/DL (ref 7–30)
CALCIUM SERPL-MCNC: 9.1 MG/DL (ref 8.5–10.1)
CHLORIDE BLD-SCNC: 108 MMOL/L (ref 94–109)
CHOLEST SERPL-MCNC: 199 MG/DL
CO2 SERPL-SCNC: 29 MMOL/L (ref 20–32)
CREAT SERPL-MCNC: 1.14 MG/DL (ref 0.66–1.25)
ERYTHROCYTE [DISTWIDTH] IN BLOOD BY AUTOMATED COUNT: 12.9 % (ref 10–15)
FASTING STATUS PATIENT QL REPORTED: YES
GFR SERPL CREATININE-BSD FRML MDRD: 86 ML/MIN/1.73M2
GLUCOSE BLD-MCNC: 88 MG/DL (ref 70–99)
HBA1C MFR BLD: 5.1 % (ref 0–5.6)
HCT VFR BLD AUTO: 45.9 % (ref 40–53)
HDLC SERPL-MCNC: 52 MG/DL
HGB BLD-MCNC: 16.2 G/DL (ref 13.3–17.7)
LDLC SERPL CALC-MCNC: 135 MG/DL
MCH RBC QN AUTO: 31.2 PG (ref 26.5–33)
MCHC RBC AUTO-ENTMCNC: 35.3 G/DL (ref 31.5–36.5)
MCV RBC AUTO: 88 FL (ref 78–100)
NONHDLC SERPL-MCNC: 147 MG/DL
PLATELET # BLD AUTO: 180 10E3/UL (ref 150–450)
POTASSIUM BLD-SCNC: 4.3 MMOL/L (ref 3.4–5.3)
PROT SERPL-MCNC: 7.9 G/DL (ref 6.8–8.8)
RBC # BLD AUTO: 5.2 10E6/UL (ref 4.4–5.9)
SODIUM SERPL-SCNC: 143 MMOL/L (ref 133–144)
TRIGL SERPL-MCNC: 62 MG/DL
TSH SERPL DL<=0.005 MIU/L-ACNC: 0.86 MU/L (ref 0.4–4)
WBC # BLD AUTO: 7.3 10E3/UL (ref 4–11)

## 2022-07-28 PROCEDURE — 85027 COMPLETE CBC AUTOMATED: CPT | Performed by: FAMILY MEDICINE

## 2022-07-28 PROCEDURE — 80053 COMPREHEN METABOLIC PANEL: CPT | Performed by: FAMILY MEDICINE

## 2022-07-28 PROCEDURE — 83036 HEMOGLOBIN GLYCOSYLATED A1C: CPT | Performed by: FAMILY MEDICINE

## 2022-07-28 PROCEDURE — 84443 ASSAY THYROID STIM HORMONE: CPT | Performed by: FAMILY MEDICINE

## 2022-07-28 PROCEDURE — 36415 COLL VENOUS BLD VENIPUNCTURE: CPT | Performed by: FAMILY MEDICINE

## 2022-07-28 PROCEDURE — 99204 OFFICE O/P NEW MOD 45 MIN: CPT | Performed by: FAMILY MEDICINE

## 2022-07-28 PROCEDURE — 99207 PR NO CHARGE NURSE ONLY: CPT

## 2022-07-28 PROCEDURE — 80061 LIPID PANEL: CPT | Performed by: FAMILY MEDICINE

## 2022-07-28 PROCEDURE — 93000 ELECTROCARDIOGRAM COMPLETE: CPT | Performed by: FAMILY MEDICINE

## 2022-07-28 ASSESSMENT — PAIN SCALES - GENERAL: PAINLEVEL: NO PAIN (0)

## 2022-07-28 NOTE — PROGRESS NOTES
Assessment & Plan       ICD-10-CM    1. Acute pain of right knee  M25.561    2. Dizziness  R42 Comprehensive metabolic panel (BMP + Alb, Alk Phos, ALT, AST, Total. Bili, TP)     CBC with platelets     TSH with free T4 reflex     Hemoglobin A1c     Lipid panel reflex to direct LDL Fasting     EKG 12-lead complete w/read - Clinics     Adult Holter Monitor 48 hour     Lipid panel reflex to direct LDL Fasting     Hemoglobin A1c     TSH with free T4 reflex     CBC with platelets     Comprehensive metabolic panel (BMP + Alb, Alk Phos, ALT, AST, Total. Bili, TP)   3. Screening for HIV (human immunodeficiency virus)  Z11.4    4. Need for hepatitis C screening test  Z11.59      Patient has had previous issues with dizziness and we did get some lab work today as well as an EKG which all looks normal so far.  His examination also is normal.  We did discuss with him that this may be a variant of anxiety though I am not sure as to why his knee would hurt.  Alternatively he may have a reason for his knee pain that gives him a vagal response.  The last part of her evaluation should include getting a Holter monitor to make sure he does not have an abnormal heart rhythm at times.  Upon completing this evaluation is unlikely there is a serious cause of this and more history can be obtained upon follow-up though this may be related to anxiety and his primary care can address this when he is ready.    Review of the result(s) of each unique test - cbc, tsh, cmp, eg  14 minutes spent on the date of the encounter doing chart review, history and exam, documentation and further activities per the note           Return in about 2 weeks (around 8/11/2022) for if not improved.    Kyra Maxwell MD, MD  [facility] is a 36 year old, presenting for the following health issues:  Knee Pain and Dizziness      Knee Pain         Pain History:  When did you first notice your pain? - Acute Pain   Have you seen  anyone else for your pain? No  Where in your body do you have pain? Musculoskeletal problem/pain  Onset/Duration: since last friday  Description  Location: knee - right  Joint Swelling: No  Redness: No  Pain: YES  Warmth: YES  Intensity:  mild  Progression of Symptoms:  same  Accompanying signs and symptoms:   Fevers: No  Numbness/tingling/weakness: YES- right foot  History  Trauma to the area: No  Recent illness:  No  Previous similar problem: No  Previous evaluation:  No  Precipitating or alleviating factors:  Aggravating factors include: standing   Therapies tried and outcome: stretching      Dizziness  Onset/Duration: about a week ago  Description:   Do you feel faint: YES  Does it feel like the surroundings (bed, room) are moving: YES  Unsteady/off balance: YES  Have you passed out or fallen: No  Intensity: mild  Progression of Symptoms: same  Accompanying Signs & Symptoms:  Heart palpitations or chest pain: No  Nausea, vomiting: No  Weakness or lack of coordination in arms or legs: No  Vision or speech changes: YES- blurry vision  Numbness or tingling: No  Ringing in ears (Tinnitus): No  Hearing Loss: No  History:   Head trauma/concussion history: No  Previous similar symptoms: No  Recent bleeding history: No  Any new medications (BP?): No  Precipitating factors:   Worse with activity: No  Worse with head movement: No  Alleviating factors:   Does staying in a fixed position give relief: YES  Therapies tried and outcome: None      Patient describes 2 episodes for which he has had sharp onset of right knee pain followed by immediately dizziness.  He feels his vision blurs and he feels unsteady and that this will pass almost as quickly as it starts.  But it has been concerning as it is a recurrent issue for him.  He generally does not have any pain with most movements or activities and he has had this sharp pain started even when he is not active.    Review of Systems   Constitutional, HEENT, cardiovascular,  pulmonary, GI, , musculoskeletal, neuro, skin, endocrine and psych systems are negative, except as otherwise noted.      Objective    /62   Pulse 70   Temp 97.9  F (36.6  C) (Temporal)   Wt 91.2 kg (201 lb)   SpO2 98%   BMI 27.84 kg/m    Body mass index is 27.84 kg/m .  Physical Exam   GENERAL: healthy, alert and no distress  EYES: Eyes grossly normal to inspection, PERRL and conjunctivae and sclerae normal  HENT: ear canals and TM's normal, nose and mouth without ulcers or lesions  NECK: no adenopathy, no asymmetry, masses, or scars and thyroid normal to palpation  RESP: lungs clear to auscultation - no rales, rhonchi or wheezes  CV: regular rate and rhythm, normal S1 S2, no S3 or S4, no murmur, click or rub, no peripheral edema and peripheral pulses strong  ABDOMEN: soft, nontender, no hepatosplenomegaly, no masses and bowel sounds normal  MS: no gross musculoskeletal defects noted, no edema, no difference in leg circumference  MS: Right Knee Exam   Right knee exam is normal.      Left Knee Exam   Left knee exam is normal.            SKIN: no suspicious lesions or rashes  NEURO: Normal strength and tone, mentation intact and speech normal  PSYCH: mentation appears normal, affect normal/bright                    .  ..

## 2022-07-28 NOTE — PROGRESS NOTES
Walk in patient was having pain in back of Right knee.  Started last Friday.  He also stated that with the pain (shooting pain down to calf area)  last Friday he got extremely dizzy.   He then had another episode of pain and dizziness today.  He stated his vision was blurred but not doubled and his blurred vision only happened with the dizziness and lasted about 5 minutes.      There has been no injury to the knee.      Also stated he has been having headaches for 3 weeks now.  One per day or one every other day and this is new for him.  The headaches last about an hour and is located in back of head.      Not taking any medications and has no allergies.      Huddled with Dr Maxwell and will be able to see patient in about an hour.  Patient was told and was ok with waiting in [facility].      Isidra Tolentino RN  [facility] ~ Registered Nurse  Clinic Triage ~ [place] [facility] [place]  July 28, 2022

## 2022-12-24 ASSESSMENT — ENCOUNTER SYMPTOMS
COUGH: 0
DIZZINESS: 1
ABDOMINAL PAIN: 0
HEARTBURN: 0
HEMATOCHEZIA: 0
EYE PAIN: 0
SORE THROAT: 0
CHILLS: 0
ARTHRALGIAS: 0
PARESTHESIAS: 0
DIARRHEA: 0
NERVOUS/ANXIOUS: 0
NAUSEA: 0
HEMATURIA: 0
JOINT SWELLING: 0
FREQUENCY: 0
MYALGIAS: 0
PALPITATIONS: 0
WEAKNESS: 0
DYSURIA: 0
CONSTIPATION: 0
HEADACHES: 0
FEVER: 0
SHORTNESS OF BREATH: 0

## 2022-12-28 ENCOUNTER — MYC MEDICAL ADVICE (OUTPATIENT)
Dept: FAMILY MEDICINE | Facility: CLINIC | Age: 37
End: 2022-12-28

## 2022-12-28 ENCOUNTER — OFFICE VISIT (OUTPATIENT)
Dept: FAMILY MEDICINE | Facility: CLINIC | Age: 37
End: 2022-12-28
Payer: COMMERCIAL

## 2022-12-28 VITALS
HEIGHT: 72 IN | DIASTOLIC BLOOD PRESSURE: 82 MMHG | BODY MASS INDEX: 28.31 KG/M2 | TEMPERATURE: 96 F | WEIGHT: 209 LBS | HEART RATE: 90 BPM | OXYGEN SATURATION: 98 % | SYSTOLIC BLOOD PRESSURE: 130 MMHG | RESPIRATION RATE: 18 BRPM

## 2022-12-28 DIAGNOSIS — G44.209 TENSION HEADACHE: ICD-10-CM

## 2022-12-28 DIAGNOSIS — R42 DIZZINESS: ICD-10-CM

## 2022-12-28 DIAGNOSIS — H61.23 BILATERAL IMPACTED CERUMEN: ICD-10-CM

## 2022-12-28 DIAGNOSIS — Z13.89 SCREENING FOR SKIN CONDITION: ICD-10-CM

## 2022-12-28 DIAGNOSIS — Z00.00 ROUTINE GENERAL MEDICAL EXAMINATION AT A HEALTH CARE FACILITY: Primary | ICD-10-CM

## 2022-12-28 LAB
ALBUMIN SERPL-MCNC: 4.3 G/DL (ref 3.4–5)
ALP SERPL-CCNC: 102 U/L (ref 40–150)
ALT SERPL W P-5'-P-CCNC: 38 U/L (ref 0–70)
ANION GAP SERPL CALCULATED.3IONS-SCNC: 4 MMOL/L (ref 3–14)
AST SERPL W P-5'-P-CCNC: 24 U/L (ref 0–45)
BASOPHILS # BLD AUTO: 0 10E3/UL (ref 0–0.2)
BASOPHILS NFR BLD AUTO: 0 %
BILIRUB SERPL-MCNC: 0.8 MG/DL (ref 0.2–1.3)
BUN SERPL-MCNC: 14 MG/DL (ref 7–30)
CALCIUM SERPL-MCNC: 9.1 MG/DL (ref 8.5–10.1)
CHLORIDE BLD-SCNC: 105 MMOL/L (ref 94–109)
CHOLEST SERPL-MCNC: 209 MG/DL
CO2 SERPL-SCNC: 30 MMOL/L (ref 20–32)
CREAT SERPL-MCNC: 1.09 MG/DL (ref 0.66–1.25)
EOSINOPHIL # BLD AUTO: 0.3 10E3/UL (ref 0–0.7)
EOSINOPHIL NFR BLD AUTO: 5 %
ERYTHROCYTE [DISTWIDTH] IN BLOOD BY AUTOMATED COUNT: 12.6 % (ref 10–15)
FASTING STATUS PATIENT QL REPORTED: YES
GFR SERPL CREATININE-BSD FRML MDRD: 90 ML/MIN/1.73M2
GLUCOSE BLD-MCNC: 111 MG/DL (ref 70–99)
HCT VFR BLD AUTO: 46.4 % (ref 40–53)
HCV AB SERPL QL IA: NONREACTIVE
HDLC SERPL-MCNC: 53 MG/DL
HGB BLD-MCNC: 16.5 G/DL (ref 13.3–17.7)
LDLC SERPL CALC-MCNC: 137 MG/DL
LYMPHOCYTES # BLD AUTO: 1.4 10E3/UL (ref 0.8–5.3)
LYMPHOCYTES NFR BLD AUTO: 24 %
MCH RBC QN AUTO: 31.8 PG (ref 26.5–33)
MCHC RBC AUTO-ENTMCNC: 35.6 G/DL (ref 31.5–36.5)
MCV RBC AUTO: 89 FL (ref 78–100)
MONOCYTES # BLD AUTO: 0.5 10E3/UL (ref 0–1.3)
MONOCYTES NFR BLD AUTO: 9 %
NEUTROPHILS # BLD AUTO: 3.5 10E3/UL (ref 1.6–8.3)
NEUTROPHILS NFR BLD AUTO: 62 %
NONHDLC SERPL-MCNC: 156 MG/DL
PLATELET # BLD AUTO: 175 10E3/UL (ref 150–450)
POTASSIUM BLD-SCNC: 3.8 MMOL/L (ref 3.4–5.3)
PROT SERPL-MCNC: 7.4 G/DL (ref 6.8–8.8)
RBC # BLD AUTO: 5.19 10E6/UL (ref 4.4–5.9)
SODIUM SERPL-SCNC: 139 MMOL/L (ref 133–144)
TRIGL SERPL-MCNC: 93 MG/DL
TSH SERPL DL<=0.005 MIU/L-ACNC: 1.52 MU/L (ref 0.4–4)
WBC # BLD AUTO: 5.6 10E3/UL (ref 4–11)

## 2022-12-28 PROCEDURE — 80061 LIPID PANEL: CPT | Performed by: PHYSICIAN ASSISTANT

## 2022-12-28 PROCEDURE — 80050 GENERAL HEALTH PANEL: CPT | Performed by: PHYSICIAN ASSISTANT

## 2022-12-28 PROCEDURE — 86803 HEPATITIS C AB TEST: CPT | Performed by: PHYSICIAN ASSISTANT

## 2022-12-28 PROCEDURE — 99213 OFFICE O/P EST LOW 20 MIN: CPT | Mod: 25 | Performed by: PHYSICIAN ASSISTANT

## 2022-12-28 PROCEDURE — 99395 PREV VISIT EST AGE 18-39: CPT | Performed by: PHYSICIAN ASSISTANT

## 2022-12-28 PROCEDURE — 36415 COLL VENOUS BLD VENIPUNCTURE: CPT | Performed by: PHYSICIAN ASSISTANT

## 2022-12-28 ASSESSMENT — ENCOUNTER SYMPTOMS
HEADACHES: 1
SHORTNESS OF BREATH: 0
COUGH: 0
WEAKNESS: 0
DIZZINESS: 1
EYE PAIN: 0
PARESTHESIAS: 0
NAUSEA: 0
DIARRHEA: 0
ARTHRALGIAS: 0
ABDOMINAL PAIN: 0
SORE THROAT: 0
CONSTIPATION: 0
FREQUENCY: 0
HEMATOCHEZIA: 0
FEVER: 0
NERVOUS/ANXIOUS: 0
HEARTBURN: 0
DYSURIA: 0
PALPITATIONS: 0
CHILLS: 0
JOINT SWELLING: 0
MYALGIAS: 0
HEMATURIA: 0

## 2022-12-28 ASSESSMENT — PAIN SCALES - GENERAL: PAINLEVEL: NO PAIN (0)

## 2022-12-28 NOTE — PROGRESS NOTES
SUBJECTIVE:   CC: Ac is an 37 year old who presents for preventative health visit.   Patient has been advised of split billing requirements and indicates understanding: Yes  Headache   Pertinent negatives include no fever, no palpitations, no shortness of breath and no nausea.   Rash  Associated symptoms include headaches and a rash. Pertinent negatives include no abdominal pain, arthralgias, chest pain, chills, congestion, coughing, fever, joint swelling, myalgias, nausea, sore throat or weakness.   Healthy Habits:     Getting at least 3 servings of Calcium per day:  NO    Bi-annual eye exam:  Yes    Dental care twice a year:  Yes    Sleep apnea or symptoms of sleep apnea:  None    Diet:  Regular (no restrictions)    Frequency of exercise:  None    Taking medications regularly:  Not Applicable    Medication side effects:  Not applicable    PHQ-2 Total Score: 0    Additional concerns today:  Yes    Was getting headache in morning and night. Was daily for 3 wks. Would last about 1 hr in back of head. No recent cold.   Happened about 4 wks ago.  Now the headaches are gone.  He was feeling fine he denies any visual changes.  He has since had a normal exam with his eye doctor.  He denies any nausea vomiting up symptoms.    Rash on right face and left groin for years -no changes he would like them removed    Dizziness that comes and goes for the last 6 months. Feels like he is going to fall over. Has stumbled.  Denies any numbness or tingling or weakness anywhere.  He denies any recent colds coughs fevers.  He states he otherwise feels fine.  He states episodes just come and go and last for about 10 minutes.  States they happen at random times.  He cannot find any pattern to it.  He has been seen in the past for this with negative work-ups      Today's PHQ-2 Score:   PHQ-2 ( 1999 [employer]) 12/24/2022   Q1: Little interest or pleasure in doing things 0   Q2: Feeling down, depressed or hopeless 0   PHQ-2 Score 0   Q1:  Little interest or pleasure in doing things Not at all   Q2: Feeling down, depressed or hopeless Not at all   PHQ-2 Score 0       Have you ever done Advance Care Planning? (For example, a Health Directive, POLST, or a discussion with a medical provider or your loved ones about your wishes): No, advance care planning information given to patient to review.  Patient declined advance care planning discussion at this time.    Social History     Tobacco Use     Smoking status: Never     Smokeless tobacco: Never   Substance Use Topics     Alcohol use: Yes     Comment: occ         Alcohol Use 12/24/2022   Prescreen: >3 drinks/day or >7 drinks/week? No   Prescreen: >3 drinks/day or >7 drinks/week? -       Last PSA: No results found for: PSA    Reviewed orders with patient. Reviewed health maintenance and updated orders accordingly - Yes  Lab work is in process  Labs reviewed in EPIC  BP Readings from Last 3 Encounters:   12/28/22 130/82   07/28/22 128/62   07/28/22 (!) 158/88    Wt Readings from Last 3 Encounters:   12/28/22 94.8 kg (209 lb)   07/28/22 91.2 kg (201 lb)   08/20/18 90.7 kg (200 lb)                  Patient Active Problem List   Diagnosis     Right groin pain     Rash and nonspecific skin eruption     Blood in semen     Elevated cholesterol     Glucosuria     Laceration of right hand without foreign body, subsequent encounter     Past Surgical History:   Procedure Laterality Date     NO HISTORY OF SURGERY         Social History     Tobacco Use     Smoking status: Never     Smokeless tobacco: Never   Substance Use Topics     Alcohol use: Yes     Comment: occ     Family History   Problem Relation Age of Onset     No Known Problems Mother      No Known Problems Father         unknown     No Known Problems Brother      Diabetes Maternal Grandmother      Cancer Maternal Grandfather         throat; cancer     No Known Problems Brother          No current outpatient medications on file.     No Known  Allergies    Reviewed and updated as needed this visit by clinical staff   Tobacco  Allergies  Meds  Problems  Med Hx  Surg Hx  Fam Hx          Reviewed and updated as needed this visit by Provider   Tobacco  Allergies  Meds  Problems  Med Hx  Surg Hx  Fam Hx           Past Medical History:   Diagnosis Date     NO ACTIVE PROBLEMS       Past Surgical History:   Procedure Laterality Date     NO HISTORY OF SURGERY         Review of Systems   Constitutional: Negative for chills and fever.   HENT: Negative for congestion, ear pain, hearing loss and sore throat.    Eyes: Negative for pain and visual disturbance.   Respiratory: Negative for cough and shortness of breath.    Cardiovascular: Negative for chest pain, palpitations and peripheral edema.   Gastrointestinal: Negative for abdominal pain, constipation, diarrhea, heartburn, hematochezia and nausea.   Genitourinary: Negative for dysuria, frequency, genital sores, hematuria, impotence, penile discharge and urgency.   Musculoskeletal: Negative for arthralgias, joint swelling and myalgias.   Skin: Positive for rash.   Neurological: Positive for dizziness and headaches. Negative for weakness and paresthesias.   Psychiatric/Behavioral: Positive for mood changes. The patient is not nervous/anxious.          OBJECTIVE:   /82   Pulse 90   Temp (!) 96  F (35.6  C) (Tympanic)   Resp 18   Ht 1.829 m (6')   Wt 94.8 kg (209 lb)   SpO2 98%   BMI 28.35 kg/m      Physical Exam  GENERAL: healthy, alert and no distress  EYES: Eyes grossly normal to inspection, PERRL and conjunctivae and sclerae normal  HENT: ear canals and TM's normal, nose and mouth without ulcers or lesions- cerumen bilateral.   NECK: no adenopathy, no asymmetry, masses, or scars and thyroid normal to palpation  RESP: lungs clear to auscultation - no rales, rhonchi or wheezes  CV: regular rate and rhythm, normal S1 S2, no S3 or S4, no murmur, click or rub, no peripheral edema and  peripheral pulses strong  ABDOMEN: soft, nontender, no hepatosplenomegaly, no masses and bowel sounds normal   (male): normal male genitalia without lesions or urethral discharge, no hernia  MS: no gross musculoskeletal defects noted, no edema  SKIN: no suspicious lesions or rashes and an area on his right cheek that is slightly erythematous with a cluster of about 5 mm circular lesions.  In his left groin he has about a 3 cm circular dry erythematous lesion.  NEURO: Normal strength and tone, mentation intact and speech normal  PSYCH: mentation appears normal, affect normal/bright      Diagnostic Test Results:  Labs reviewed in Epic    ASSESSMENT/PLAN:       ICD-10-CM    1. Routine general medical examination at a health care facility  Z00.00 Hepatitis C Screen Reflex to HCV RNA Quant and Genotype     Lipid panel reflex to direct LDL Fasting     Comprehensive metabolic panel (BMP + Alb, Alk Phos, ALT, AST, Total. Bili, TP)     TSH with free T4 reflex     CBC with platelets and differential     Hepatitis C Screen Reflex to HCV RNA Quant and Genotype     Lipid panel reflex to direct LDL Fasting     Comprehensive metabolic panel (BMP + Alb, Alk Phos, ALT, AST, Total. Bili, TP)     TSH with free T4 reflex     CBC with platelets and differential      2. Dizziness  R42 Adult ENT  Referral      3. Bilateral impacted cerumen  H61.23       4. Screening for skin condition  Z13.89 Adult Dermatology Referral      5. Tension headache  G44.209         1. Work on Healthy diet and exercise. Getting heart rate elevated for 30 mins most days of week.  2.  Unclear etiology.  Labs are pending.  I referred him to ENT for second opinion may consider doing PHYSICAL THERAPY  3.  Cerumen was irrigated out by my MA.  We will see if this may help some of his dizziness.  4.  Well follow-up with dermatology for skin screening.  5.  Symptoms have resolved.  We will just monitor this conservatively.  Follow-up as  needed.      COUNSELING:   Reviewed preventive health counseling, as reflected in patient instructions       Regular exercise       Healthy diet/nutrition       Vision screening      BMI:   Estimated body mass index is 28.35 kg/m  as calculated from the following:    Height as of this encounter: 1.829 m (6').    Weight as of this encounter: 94.8 kg (209 lb).   Weight management plan: Discussed healthy diet and exercise guidelines      He reports that he has never smoked. He has never used smokeless tobacco.            Bandar Hendrickson PA-C  [facility]

## 2022-12-28 NOTE — PROGRESS NOTES
{PROVIDER CHARTING PREFERENCE:016604}    Subjective   Ac is a 37 year old accompanied by his self, presenting for the following health issues:  Headache, Shoulder, Dizziness (/For awhile now), and Rash      Headache   Pertinent negatives include no fever, no palpitations, no shortness of breath and no nausea.   Rash  Associated symptoms include headaches and a rash. Pertinent negatives include no abdominal pain, arthralgias, chest pain, chills, congestion, coughing, fever, joint swelling, myalgias, nausea, sore throat or weakness.   Healthy Habits:     Getting at least 3 servings of Calcium per day:  NO    Bi-annual eye exam:  Yes    Dental care twice a year:  Yes    Sleep apnea or symptoms of sleep apnea:  None    Diet:  Regular (no restrictions)    Frequency of exercise:  None    Taking medications regularly:  Not Applicable    Medication side effects:  Not applicable    PHQ-2 Total Score: 0    Additional concerns today:  Yes         {additonal problems for provider to add (Optional):259546}    Review of Systems   Constitutional: Negative for chills and fever.   HENT: Negative for congestion, ear pain, hearing loss and sore throat.    Eyes: Negative for pain and visual disturbance.   Respiratory: Negative for cough and shortness of breath.    Cardiovascular: Negative for chest pain, palpitations and peripheral edema.   Gastrointestinal: Negative for abdominal pain, constipation, diarrhea, heartburn, hematochezia and nausea.   Genitourinary: Negative for dysuria, frequency, genital sores, hematuria, impotence, penile discharge and urgency.   Musculoskeletal: Negative for arthralgias, joint swelling and myalgias.   Skin: Positive for rash.   Neurological: Positive for dizziness and headaches. Negative for weakness and paresthesias.   Psychiatric/Behavioral: Positive for mood changes. The patient is not nervous/anxious.       {ROS COMP (Optional):313282}      Objective    BP (!) 146/89   Pulse 90   Temp (!) 96   F (35.6  C) (Tympanic)   Resp 18   Ht 1.829 m (6')   Wt 94.8 kg (209 lb)   SpO2 98%   BMI 28.35 kg/m    Body mass index is 28.35 kg/m .  Physical Exam   {Exam List (Optional):234686}    {Diagnostic Test Results (Optional):781983}    {AMBULATORY ATTESTATION (Optional):596503}

## 2022-12-28 NOTE — NURSING NOTE
Patient identified using two patient identifiers.  Ear exam showing wax occlusion completed by provider.  Solution: warm water was placed in the both ear(s) via irrigation tool: kyle allred.  Esperanza Vaughn MA

## 2023-01-08 ENCOUNTER — HEALTH MAINTENANCE LETTER (OUTPATIENT)
Age: 38
End: 2023-01-08

## 2023-01-19 ENCOUNTER — VIRTUAL VISIT (OUTPATIENT)
Dept: FAMILY MEDICINE | Facility: CLINIC | Age: 38
End: 2023-01-19
Payer: COMMERCIAL

## 2023-01-19 DIAGNOSIS — R06.83 SNORES: ICD-10-CM

## 2023-01-19 DIAGNOSIS — F41.9 ANXIETY: Primary | ICD-10-CM

## 2023-01-19 PROCEDURE — 99213 OFFICE O/P EST LOW 20 MIN: CPT | Mod: 95 | Performed by: PHYSICIAN ASSISTANT

## 2023-01-19 RX ORDER — FLUOXETINE 10 MG/1
10 CAPSULE ORAL DAILY
Qty: 30 CAPSULE | Refills: 1 | Status: SHIPPED | OUTPATIENT
Start: 2023-01-19 | End: 2023-03-15

## 2023-01-19 NOTE — PROGRESS NOTES
Ac is a 37 year old who is being evaluated via a billable video visit.      How would you like to obtain your AVS? MyChart  If the video visit is dropped, the invitation should be resent by: Text to cell phone: [phone]  Will anyone else be joining your video visit? No          Assessment & Plan       ICD-10-CM    1. Anxiety  F41.9 Adult Mental Health  Referral     FLUoxetine (PROZAC) 10 MG capsule      2. Snores  R06.83 Adult Sleep Eval & Management  Referral      1.  Talk to patient on his concerns we will get him set up for counseling we will get started on Prozac.  Warning signs side effects were discussed recheck things in about 4 weeks.  2.  Based on his symptoms and history we will have him follow-up with sleep clinic for second opinion.    Return in about 4 weeks (around 2/16/2023) for recheck.    Bandar Hendrickson PA-C  [facility]   Ac is a 37 year old accompanied by his self, presenting for the following health issues:  Depression      History of Present Illness       Reason for visit:  Follow-up    He eats 0-1 servings of fruits and vegetables daily.He consumes 1 sweetened beverage(s) daily.He exercises with enough effort to increase his heart rate 9 or less minutes per day.  He exercises with enough effort to increase his heart rate 3 or less days per week.   He is taking medications regularly.       Discuss mental health problems   -Tense shoulders   -Fatigue/tired often   -Lost motivation  -Hard time concentrating   Going on for over 1 yr.   No childhood diagnosis no struggles with school in the past.   Off and on good sleep. His daughter wakes him up a lot.   Snores, and possible apneic spells then hard time getting back to sleep.   Caffiene: 1-2 energy drinks on weekends.   1/2 liter etoh on weekends.   Works as [employer] worker.       Review of Systems   Constitutional, HEENT, cardiovascular, pulmonary, gi and gu systems are negative,  except as otherwise noted.      Objective           Vitals:  No vitals were obtained today due to virtual visit.    Physical Exam   GENERAL: Healthy, alert and no distress  EYES: Eyes grossly normal to inspection.  No discharge or erythema, or obvious scleral/conjunctival abnormalities.  RESP: No audible wheeze, cough, or visible cyanosis.  No visible retractions or increased work of breathing.    SKIN: Visible skin clear. No significant rash, abnormal pigmentation or lesions.  NEURO: Cranial nerves grossly intact.  Mentation and speech appropriate for age.  PSYCH: Mentation appears normal, affect normal/bright, judgement and insight intact, normal speech and appearance well-groomed.            Video-Visit Details    Type of service:  Video Visit     Originating Location (pt. Location): Home    Distant Location (provider location):  Off-site  Platform used for Video Visit: [employer]

## 2023-01-23 ENCOUNTER — TELEPHONE (OUTPATIENT)
Dept: FAMILY MEDICINE | Facility: CLINIC | Age: 38
End: 2023-01-23
Payer: COMMERCIAL

## 2023-03-15 DIAGNOSIS — F41.9 ANXIETY: ICD-10-CM

## 2023-03-15 RX ORDER — FLUOXETINE 10 MG/1
CAPSULE ORAL
Qty: 30 CAPSULE | Refills: 1 | Status: SHIPPED | OUTPATIENT
Start: 2023-03-15 | End: 2023-05-08

## 2023-04-12 ENCOUNTER — OFFICE VISIT (OUTPATIENT)
Dept: DERMATOLOGY | Facility: CLINIC | Age: 38
End: 2023-04-12
Payer: COMMERCIAL

## 2023-04-12 DIAGNOSIS — L57.0 AK (ACTINIC KERATOSIS): ICD-10-CM

## 2023-04-12 DIAGNOSIS — D22.9 NEVUS: ICD-10-CM

## 2023-04-12 DIAGNOSIS — L81.4 LENTIGO: Primary | ICD-10-CM

## 2023-04-12 DIAGNOSIS — Z13.89 SCREENING FOR SKIN CONDITION: ICD-10-CM

## 2023-04-12 DIAGNOSIS — D23.9 DERMAL NEVUS: ICD-10-CM

## 2023-04-12 DIAGNOSIS — D18.01 ANGIOMA OF SKIN: ICD-10-CM

## 2023-04-12 PROCEDURE — 99243 OFF/OP CNSLTJ NEW/EST LOW 30: CPT | Performed by: DERMATOLOGY

## 2023-04-12 RX ORDER — TRETINOIN 0.5 MG/G
CREAM TOPICAL AT BEDTIME
Qty: 45 G | Refills: 6 | Status: SHIPPED | OUTPATIENT
Start: 2023-04-12

## 2023-04-12 ASSESSMENT — PAIN SCALES - GENERAL: PAINLEVEL: NO PAIN (0)

## 2023-04-12 NOTE — LETTER
4/12/2023         RE: Ac Becerra  [address] [facility] [address]        Dear Colleague,    Thank you for referring your patient, Ac Becerra, to [facility]. Please see a copy of my visit note below.    Ac Becerra , a 37 year old year old male patient, I was asked to see by A Oppel for spots on skin.  Patient states this has been present for a while on right cheek.  Patient reports the following symptoms:  red .  Patient reports the following previous treatments none.  Patient reports the following modifying factors none.  Associated symptoms: none.  Patient has no other skin complaints today.  Remainder of the HPI, Meds, PMH, Allergies, FH, and SH was reviewed in chart.      Past Medical History:   Diagnosis Date     NO ACTIVE PROBLEMS        Past Surgical History:   Procedure Laterality Date     NO HISTORY OF SURGERY          Family History   Problem Relation Age of Onset     No Known Problems Mother      No Known Problems Father         unknown     No Known Problems Brother      Diabetes Maternal Grandmother      Cancer Maternal Grandfather         throat; cancer     No Known Problems Brother        Social History     Socioeconomic History     Marital status:      Spouse name: Not on file     Number of children: Not on file     Years of education: Not on file     Highest education level: Not on file   Occupational History     Not on file   Tobacco Use     Smoking status: Never     Smokeless tobacco: Never   Vaping Use     Vaping status: Never Used   Substance and Sexual Activity     Alcohol use: Yes     Comment: occ     Drug use: No     Sexual activity: Yes     Partners: Female     Birth control/protection: None   Other Topics Concern     Not on file   Social History Narrative     Not on file     Social Determinants of Health     Financial Resource Strain: Not on file   Food Insecurity: Not on file   Transportation Needs: Not on file   Physical Activity: Not  on file   Stress: Not on file   Social Connections: Not on file   Intimate Partner Violence: Not on file   Housing Stability: Not on file       Outpatient Encounter Medications as of 4/12/2023   Medication Sig Dispense Refill     FLUoxetine (PROZAC) 10 MG capsule TAKE 1 CAPSULE BY MOUTH EVERY DAY 30 capsule 1     No facility-administered encounter medications on file as of 4/12/2023.             Review Of Systems  Skin: As above  Eyes: negative  Ears/Nose/Throat: negative  Respiratory: No shortness of breath, dyspnea on exertion, cough, or hemoptysis  Cardiovascular: negative  Gastrointestinal: negative  Genitourinary: negative  Musculoskeletal: negative  Neurologic: negative  Psychiatric: negative  Hematologic/Lymphatic/Immunologic: negative  Endocrine: negative      O:   NAD, WDWN, Alert & Oriented, Mood & Affect wnl, Vitals stable   Here today alone'   General appearance garry ii   Vitals stab;e   Alert, oriented and in no acute distress   R cheek pink scaly papule   pigmented macules on trunk and ext with regular borders and pigment networks   brown macules on trunk and ext    Red papules on trunk   Flesh colored papules on trunk      The remainder of the full exam was normal; the following areas were examined:  conjunctiva/lids, , neck, peripheral vascular system, abdomen, lymph nodes, digits/nails, eccrine and apocrine glands, scalp/hair, face, neck, chest, abdomen, buttocks, back, RUE, LUE, RLE, LLE       Eyes: Conjunctivae/lids:Normal     ENT: Lips, buccal mucosa, tongue: normal    MSK:Normal    Cardiovascular: peripheral edema none    Pulm: Breathing Normal    Lymph Nodes: No Head and Neck Lymphadenopathy     Neuro/Psych: Orientation:Normal; Mood/Affect:Normal      A/P:  1. Nevi, lentigo, angioma, dermal nevus  2. R cheek actinic keratosis   Pathophysiology discussed with pateint   Tretinoin nightly   It was a pleasure speaking to Ac Becerra today.  Previous clinic  notes and pertinent laboratory tests  were reviewed prior to Ac Becerra's visit.  Nature of benign skin lesions dicussed with patient.  Signs and Symptoms of skin cancer discussed with patient.  Patient encouraged to perform monthly skin exams.  UV precautions reviewed with patient.  Return to clinic 6 months        Again, thank you for allowing me to participate in the care of your patient.        Sincerely,        Leonardo Andres MD

## 2023-04-12 NOTE — PROGRESS NOTES
Ac Becerra , a 37 year old year old male patient, I was asked to see by A Oppel for spots on skin.  Patient states this has been present for a while on right cheek.  Patient reports the following symptoms:  red .  Patient reports the following previous treatments none.  Patient reports the following modifying factors none.  Associated symptoms: none.  Patient has no other skin complaints today.  Remainder of the HPI, Meds, PMH, Allergies, FH, and SH was reviewed in chart.      Past Medical History:   Diagnosis Date     NO ACTIVE PROBLEMS        Past Surgical History:   Procedure Laterality Date     NO HISTORY OF SURGERY          Family History   Problem Relation Age of Onset     No Known Problems Mother      No Known Problems Father         unknown     No Known Problems Brother      Diabetes Maternal Grandmother      Cancer Maternal Grandfather         throat; cancer     No Known Problems Brother        Social History     Socioeconomic History     Marital status:      Spouse name: Not on file     Number of children: Not on file     Years of education: Not on file     Highest education level: Not on file   Occupational History     Not on file   Tobacco Use     Smoking status: Never     Smokeless tobacco: Never   Vaping Use     Vaping status: Never Used   Substance and Sexual Activity     Alcohol use: Yes     Comment: occ     Drug use: No     Sexual activity: Yes     Partners: Female     Birth control/protection: None   Other Topics Concern     Not on file   Social History Narrative     Not on file     Social Determinants of Health     Financial Resource Strain: Not on file   Food Insecurity: Not on file   Transportation Needs: Not on file   Physical Activity: Not on file   Stress: Not on file   Social Connections: Not on file   Intimate Partner Violence: Not on file   Housing Stability: Not on file       Outpatient Encounter Medications as of 4/12/2023   Medication Sig Dispense Refill     FLUoxetine  (PROZAC) 10 MG capsule TAKE 1 CAPSULE BY MOUTH EVERY DAY 30 capsule 1     No facility-administered encounter medications on file as of 4/12/2023.             Review Of Systems  Skin: As above  Eyes: negative  Ears/Nose/Throat: negative  Respiratory: No shortness of breath, dyspnea on exertion, cough, or hemoptysis  Cardiovascular: negative  Gastrointestinal: negative  Genitourinary: negative  Musculoskeletal: negative  Neurologic: negative  Psychiatric: negative  Hematologic/Lymphatic/Immunologic: negative  Endocrine: negative      O:   NAD, WDWN, Alert & Oriented, Mood & Affect wnl, Vitals stable   Here today alone'   General appearance garry ii   Vitals stab;e   Alert, oriented and in no acute distress   R cheek pink scaly papule   pigmented macules on trunk and ext with regular borders and pigment networks   brown macules on trunk and ext    Red papules on trunk   Flesh colored papules on trunk      The remainder of the full exam was normal; the following areas were examined:  conjunctiva/lids, , neck, peripheral vascular system, abdomen, lymph nodes, digits/nails, eccrine and apocrine glands, scalp/hair, face, neck, chest, abdomen, buttocks, back, RUE, LUE, RLE, LLE       Eyes: Conjunctivae/lids:Normal     ENT: Lips, buccal mucosa, tongue: normal    MSK:Normal    Cardiovascular: peripheral edema none    Pulm: Breathing Normal    Lymph Nodes: No Head and Neck Lymphadenopathy     Neuro/Psych: Orientation:Normal; Mood/Affect:Normal      A/P:  1. Nevi, lentigo, angioma, dermal nevus  2. R cheek actinic keratosis   Pathophysiology discussed with pateint   Tretinoin nightly   It was a pleasure speaking to Ac Becerra today.  Previous clinic  notes and pertinent laboratory tests were reviewed prior to Ac Becerra's visit.  Nature of benign skin lesions dicussed with patient.  Signs and Symptoms of skin cancer discussed with patient.  Patient encouraged to perform monthly skin exams.  UV precautions reviewed with  patient.  Return to clinic 6 months

## 2023-05-17 ENCOUNTER — APPOINTMENT (OUTPATIENT)
Dept: GENERAL RADIOLOGY | Facility: CLINIC | Age: 38
End: 2023-05-17
Attending: EMERGENCY MEDICINE
Payer: OTHER MISCELLANEOUS

## 2023-05-17 ENCOUNTER — HOSPITAL ENCOUNTER (EMERGENCY)
Facility: CLINIC | Age: 38
Discharge: HOME | End: 2023-05-17
Attending: EMERGENCY MEDICINE | Admitting: EMERGENCY MEDICINE
Payer: OTHER MISCELLANEOUS

## 2023-05-17 VITALS
BODY MASS INDEX: 27.09 KG/M2 | WEIGHT: 200 LBS | HEIGHT: 72 IN | OXYGEN SATURATION: 97 % | SYSTOLIC BLOOD PRESSURE: 167 MMHG | HEART RATE: 96 BPM | TEMPERATURE: 97.1 F | DIASTOLIC BLOOD PRESSURE: 96 MMHG | RESPIRATION RATE: 20 BRPM

## 2023-05-17 DIAGNOSIS — S62.522B OPEN FRACTURE OF TUFT OF DISTAL PHALANX OF LEFT THUMB: ICD-10-CM

## 2023-05-17 PROCEDURE — 12032 INTMD RPR S/A/T/EXT 2.6-7.5: CPT | Mod: 59 | Performed by: EMERGENCY MEDICINE

## 2023-05-17 PROCEDURE — 250N000013 HC RX MED GY IP 250 OP 250 PS 637: Performed by: EMERGENCY MEDICINE

## 2023-05-17 PROCEDURE — 26750 TREAT FINGER FRACTURE EACH: CPT | Mod: FA | Performed by: EMERGENCY MEDICINE

## 2023-05-17 PROCEDURE — 26750 TREAT FINGER FRACTURE EACH: CPT | Mod: 54 | Performed by: EMERGENCY MEDICINE

## 2023-05-17 PROCEDURE — 99284 EMERGENCY DEPT VISIT MOD MDM: CPT | Mod: 25 | Performed by: EMERGENCY MEDICINE

## 2023-05-17 PROCEDURE — 73140 X-RAY EXAM OF FINGER(S): CPT | Mod: LT

## 2023-05-17 RX ORDER — CEPHALEXIN 500 MG/1
500 CAPSULE ORAL ONCE
Status: COMPLETED | OUTPATIENT
Start: 2023-05-17 | End: 2023-05-17

## 2023-05-17 RX ORDER — CEPHALEXIN 500 MG/1
500 CAPSULE ORAL 4 TIMES DAILY
Qty: 28 CAPSULE | Refills: 0 | Status: SHIPPED | OUTPATIENT
Start: 2023-05-17 | End: 2023-05-24

## 2023-05-17 RX ADMIN — CEPHALEXIN 500 MG: 500 CAPSULE ORAL at 12:01

## 2023-05-17 ASSESSMENT — ACTIVITIES OF DAILY LIVING (ADL): ADLS_ACUITY_SCORE: 35

## 2023-05-17 NOTE — ED TRIAGE NOTES
Patient reports today ~0930, cut his left thumb with table saw. Patient able to move. Sensation intact. Bleeding controlled. Last tetanus 2017.     Triage Assessment     Row Name 05/17/23 0994       Triage Assessment (Adult)    Airway WDL WDL       Respiratory WDL    Respiratory WDL WDL       Skin Circulation/Temperature WDL    Skin Circulation/Temperature WDL X  Large laceration to left thumb       Cardiac WDL    Cardiac WDL WDL       Peripheral/Neurovascular WDL    Peripheral Neurovascular WDL WDL

## 2023-05-17 NOTE — LETTER
[facility] EMERGENCY DEPT  [address] [facility] [address]  [phone]      May 17, 2023    Ac Becerra  [address] [facility] [place] [facility] [address]  [phone] (home)     : 1985      To Whom it may concern:    Ac Becerra was seen in our Emergency Department today, May 17, 2023 for an injury that was reported to be work related.      For the next 1 days he should not work    The employee might be taking medication so that they cannot operate moving machinery or perform activities that require balancing or working above ground.    After returning to work the following restrictions apply until cleared by orthopedics:   no use of left hand    The employee must keep the injured site clean and dry.    Sincerely,    Mario Bran MD

## 2023-05-17 NOTE — ED PROVIDER NOTES
History     Chief Complaint   Patient presents with     Laceration     HPI  Ac Becerra is a 37 year old right-hand-dominant male who presents to the emergency department after lacerating his left thumb while using a table saw at work.  He reports that he was pushing the board when his left hand must of slipped and coming to contact with the blade.  He has a soft tissue defect at the tip of his thumb but does report that he can feel sensation at the distal aspect of the thumb though it does feel somewhat different than his right thumb.  He denies any decreased range of motion at the interphalangeal joint.  He reports being up-to-date on tetanus and review of records demonstrates last tetanus shot was August 2017.    Allergies:  No Known Allergies    Problem List:    Patient Active Problem List    Diagnosis Date Noted     Laceration of right hand without foreign body, subsequent encounter 08/20/2018     Priority: Medium     Elevated cholesterol 06/13/2018     Priority: Medium     Glucosuria 06/13/2018     Priority: Medium     Right groin pain 06/12/2018     Priority: Medium     Rash and nonspecific skin eruption 06/12/2018     Priority: Medium     Blood in semen 06/12/2018     Priority: Medium        Past Medical History:    Past Medical History:   Diagnosis Date     NO ACTIVE PROBLEMS        Past Surgical History:    Past Surgical History:   Procedure Laterality Date     NO HISTORY OF SURGERY         Family History:    Family History   Problem Relation Age of Onset     No Known Problems Mother      No Known Problems Father         unknown     No Known Problems Brother      Diabetes Maternal Grandmother      Cancer Maternal Grandfather         throat; cancer     No Known Problems Brother        Social History:  Marital Status:   [2]  Social History     Tobacco Use     Smoking status: Never     Smokeless tobacco: Never   Vaping Use     Vaping status: Never Used   Substance Use Topics     Alcohol use: Yes      Comment: occ     Drug use: No        Medications:    cephALEXin (KEFLEX) 500 MG capsule  FLUoxetine (PROZAC) 10 MG capsule  tretinoin (RETIN-A) 0.05 % external cream          Review of Systems   All other systems reviewed and are negative.      Physical Exam   BP: (!) 167/96  Pulse: 96  Temp: 97.1  F (36.2  C)  Resp: 20  Height: 182.9 cm (6')  Weight: 90.7 kg (200 lb)  SpO2: 97 %      Physical Exam  Vitals and nursing note reviewed.   Constitutional:       General: He is not in acute distress.     Appearance: He is well-developed. He is not ill-appearing or toxic-appearing.   HENT:      Head: Normocephalic and atraumatic.   Eyes:      General: No scleral icterus.     Pupils: Pupils are equal, round, and reactive to light.   Cardiovascular:      Rate and Rhythm: Normal rate.   Pulmonary:      Effort: Pulmonary effort is normal. No respiratory distress.   Musculoskeletal:      Left hand: Laceration present. Normal range of motion. Decreased sensation. Normal capillary refill.        Hands:       Cervical back: Normal range of motion and neck supple.   Skin:     General: Skin is warm and dry.      Coloration: Skin is not pale.      Findings: No rash.   Neurological:      Mental Status: He is alert and oriented to person, place, and time.      Sensory: No sensory deficit.   Psychiatric:         Behavior: Behavior normal.         ED [facility]    -Laceration Repair    Performed by: Mario Bran MD  Authorized by: Mario Bran MD    Risks, benefits and alternatives discussed.      ANESTHESIA (see MAR for exact dosages):     Anesthesia method:  Nerve block    Block location:  Left thumb    Block needle gauge:  27 G    Block anesthetic:  Bupivacaine 0.5% w/o epi    Block technique:  Volar infiltration    Block injection procedure:  Anatomic landmarks identified, anatomic landmarks palpated, introduced needle, negative aspiration for blood and  incremental injection    Block outcome:  Anesthesia achieved      LACERATION DETAILS     Location:  Finger    Finger location:  L thumb    Length (cm):  4    REPAIR TYPE:     Repair type:  Intermediate      EXPLORATION:     Wound exploration: wound explored through full range of motion      Wound extent: underlying fracture      Contaminated: no      TREATMENT:     Area cleansed with:  Saline    Amount of cleaning:  Extensive    Irrigation solution:  Sterile saline    Irrigation volume:  2 L    Irrigation method:  Syringe    SKIN REPAIR     Repair method:  Sutures    Suture size:  4-0    Wound skin closure material used: Ethilon.    Suture technique:  Simple interrupted    Number of sutures:  5    APPROXIMATION     Approximation:  Loose    POST-PROCEDURE DETAILS     Dressing:  Antibiotic ointment, non-adherent dressing and tube gauze        PROCEDURE    Patient Tolerance:  Patient tolerated the procedure well with no immediate complications           Results for orders placed or performed during the hospital encounter of 05/17/23   Fingers XR, 2-3 views, left     Status: None    Narrative    XR FINGER LEFT G/E 2 VIEWS 5/17/2023 10:23 AM     HISTORY: Trauma, pain    COMPARISON: None.         Impression    IMPRESSION: Soft tissue defect over the tip of the thumb. Slight  irregularly along the ulnar aspect of the tuft of the distal phalanx  could represent a tiny fracture. With the associated soft tissue  defect, an open fracture cannot be excluded. No extension into the IP  joint. Recommend follow-up examination.    MICHELLE KANG MD         SYSTEM ID:  [id number]     Medications   cephALEXin (KEFLEX) capsule 500 mg (500 mg Oral $Given 5/17/23 1201)       Assessments & Plan (with Medical Decision Making)     I have reviewed the nursing notes.    I have reviewed the findings, diagnosis, plan and need for follow up with the patient.  This patient presented to the emergency department after injuring his left thumb using a  table saw.  Injury is to the distal thumb and there is no indication of tendon injury.  It does appear to have penetrated to the distal phalanx on x-ray making this an open fracture.  Wound was cleansed and repaired as per the above procedure note.  There was some significant soft tissue loss, so remaining soft tissue was brought together to help facilitate healing.  Patient will be started on antibiotics and was referred to follow-up with orthopedics for reevaluation and wound check.  He was discharged with instructions for care and follow-up in good condition.        Discharge Medication List as of 5/17/2023 12:04 PM      START taking these medications    Details   cephALEXin (KEFLEX) 500 MG capsule Take 1 capsule (500 mg) by mouth 4 times daily for 7 days, Disp-28 capsule, R-0, E-Prescribe             Final diagnoses:   Open fracture of tuft of distal phalanx of left thumb       5/17/2023   [facility] EMERGENCY DEPT     Mario Bran MD  05/19/23 9621

## 2023-05-17 NOTE — DISCHARGE INSTRUCTIONS
Follow-up with orthopedic surgery.  They should call you to set up an appointment.  If you have not gotten into see them by Saturday morning, please return to the emergency department for dressing change and wound check.    Keflex as directed.    Keep dressing clean and dry.    Tylenol and/or ibuprofen for pain.    Return to the emergency department for fevers, severe pain, red streaks or any other problems.

## 2023-05-18 ENCOUNTER — PRE VISIT (OUTPATIENT)
Dept: ORTHOPEDICS | Facility: CLINIC | Age: 38
End: 2023-05-18
Payer: COMMERCIAL

## 2023-05-18 ENCOUNTER — TELEPHONE (OUTPATIENT)
Dept: ORTHOPEDICS | Facility: CLINIC | Age: 38
End: 2023-05-18
Payer: COMMERCIAL

## 2023-05-18 NOTE — TELEPHONE ENCOUNTER
After reviewing injury and XR with Dr. Sabillon, I called the patient to assist in rescheduling patient to Hand team, Dr. Bailey on 5/24/23 at 1:45PM. Patient reports that he had his left thumb sutured in the ED yesterday, but they instructed him to be seen today or tomorrow to see if grafting would be needed or if it is fine to heal as is. I spoke to the hand team and due to limited hand providers in clinic we are unable to see him today or tomorrow. He was told by the ED to return to ED if not able to seen by hand team by today or tomorrow for a dressing change. I instructed him to return to ED for dressing change, but did place him on Dr. Bailey's scheduled on 5/24/23 just in case. He was instructed to tell ED staff that he has that appointment and ask them if he should be seen elsewhere sooner or keep that appointment with us. Patient also stated he would upload a picture of his wound to [employer] to allow us to get more information. All questions were answered at this time.

## 2023-05-18 NOTE — TELEPHONE ENCOUNTER
DIAGNOSIS: Open fracture of tuft of distal phalanx of left thumb \ [facility]\ imaging\ [employer]\ ortho con   APPOINTMENT DATE: 5/18/23   NOTES STATUS DETAILS   OFFICE NOTE from referring provider Internal 5/17/23 referral    DISCHARGE REPORT from the ER Internal [facility] ED: 5/17/23   MEDICATION LIST Internal    XRAYS (IMAGES & REPORTS) Internal XR Finger Left: 5/17/23

## 2023-05-18 NOTE — TELEPHONE ENCOUNTER
DIAGNOSIS: LT Thumb Open Fx   APPOINTMENT DATE: 05/24/2023   NOTES STATUS DETAILS   DISCHARGE REPORT from the ER Internal 05/17/2023 - [facility] ED   MEDICATION LIST Internal    LABS     XRAYS (IMAGES & REPORTS) PACS Internal

## 2023-05-23 NOTE — PROGRESS NOTES
Chief Complaint:   Chief Complaint   Patient presents with     Consult     Open tuft fracture distal phalanx of Left thumb DOI: 5/17/23       Date of Injury: 5/17/2023  Mechanism of Injury: table saw  Diagnosis: Left thumb tip laceration  Treatment: Laceration repair in emergency department    History of Present Illness: Ac Becerra is a 37 year old RHD male presenting for evaluation of left thumb.    He injured his thumb on a table saw at work.  He was prescribed antibiotics which he is taking.  His wife is a nurse and has been helping him with wound care.  He denies drainage, fever or chills.    Clinical documentation by Dr. Bran on 5/17/2023 was reviewed.    Occupation: [employer]    Past Medical History:   Past Medical History:   Diagnosis Date     NO ACTIVE PROBLEMS        Past Surgical History:   Past Surgical History:   Procedure Laterality Date     NO HISTORY OF SURGERY         Medications:   Current Outpatient Medications:      cephALEXin (KEFLEX) 500 MG capsule, Take 1 capsule (500 mg) by mouth 4 times daily for 7 days, Disp: 28 capsule, Rfl: 0     FLUoxetine (PROZAC) 10 MG capsule, TAKE 1 CAPSULE BY MOUTH EVERY DAY, Disp: 90 capsule, Rfl: 0     tretinoin (RETIN-A) 0.05 % external cream, Apply topically At Bedtime, Disp: 45 g, Rfl: 6    Allergy: No Known Allergies    Social History:   History   Smoking Status     Never   Smokeless Tobacco     Never      Social History     Tobacco Use     Smoking status: Never     Smokeless tobacco: Never   Vaping Use     Vaping status: Never Used   Substance Use Topics     Alcohol use: Yes     Comment: occ     Drug use: No        Family History:   Family History   Problem Relation Age of Onset     No Known Problems Mother      No Known Problems Father         unknown     No Known Problems Brother      Diabetes Maternal Grandmother      Cancer Maternal Grandfather         throat; cancer     No Known Problems Brother        Physical Examination:  There were no  vitals filed for this visit.  There is no height or weight on file to calculate BMI.    Well appearing, well nourished  Alert and oriented x 3, cooperative with exam     Left thumb  Laceration at distal thumb with dried blood/eschar, nylon sutures in place  ROM IP joint 0-20, limited by pain and stiffness  Motor Exam: Intact TU/OK/x2-3  Sensory Exam: Sensation intact to light touch in FDWS (radial), volar IF (median), volar SF (ulnar), intact to light touch at the tip of the thumb  Vascular Exam: Fingers warm and well-perfused    Imaging/Studies:  XR (3 views) of the left thumb was obtained 5/24/2023.  I reviewed the images with the patient.  The imaging study shows nondisplaced tuft fracture.    XR (3 views) of the left thumb was obtained 5/17/2023.  I reviewed the images with the patient.  The imaging study shows nondisplaced tuft fracture.    Assessment: Ac Becerra is a 37 year old male with left thumb laceration and open tuft fracture.    Plan:   I had a discussion with the patient regarding my clinical findings, diagnosis, and treatment plan.  The nylon sutures were removed today except for one suture that is buried under the eschar.  I recommend beginning normal hand hygiene and once the remaining suture is visible, that will be removed.  I discussed that the eschar may fall off which could result in a wound.  If that were to occur, I recommend healing by secondary intention.  I described the wound care that would be required which is daily antibiotic ointment followed by a Band-Aid.  The patient verbalized understanding.  All questions answered.      Normal daily hand hygiene  - Tip protector splint provided for comfort  - Complete antibiotics as prescribed  - Continued monitoring of wound    Next Visit:    Follow-up: 2 week(s).    Imaging: None.    Plan: Wound check, final suture removal.      WESTON GAMBOA MD

## 2023-05-24 ENCOUNTER — OFFICE VISIT (OUTPATIENT)
Dept: ORTHOPEDICS | Facility: CLINIC | Age: 38
End: 2023-05-24
Attending: EMERGENCY MEDICINE
Payer: OTHER MISCELLANEOUS

## 2023-05-24 ENCOUNTER — ANCILLARY PROCEDURE (OUTPATIENT)
Dept: GENERAL RADIOLOGY | Facility: CLINIC | Age: 38
End: 2023-05-24
Attending: STUDENT IN AN ORGANIZED HEALTH CARE EDUCATION/TRAINING PROGRAM
Payer: OTHER MISCELLANEOUS

## 2023-05-24 ENCOUNTER — PRE VISIT (OUTPATIENT)
Dept: ORTHOPEDICS | Facility: CLINIC | Age: 38
End: 2023-05-24
Payer: COMMERCIAL

## 2023-05-24 DIAGNOSIS — M79.646 PAIN IN FINGER: Primary | ICD-10-CM

## 2023-05-24 DIAGNOSIS — S62.522B OPEN FRACTURE OF TUFT OF DISTAL PHALANX OF LEFT THUMB: ICD-10-CM

## 2023-05-24 DIAGNOSIS — M79.646 PAIN IN FINGER: ICD-10-CM

## 2023-05-24 PROCEDURE — 99203 OFFICE O/P NEW LOW 30 MIN: CPT | Performed by: STUDENT IN AN ORGANIZED HEALTH CARE EDUCATION/TRAINING PROGRAM

## 2023-05-24 PROCEDURE — 73140 X-RAY EXAM OF FINGER(S): CPT | Mod: LT | Performed by: RADIOLOGY

## 2023-05-24 NOTE — LETTER
5/24/2023         RE: Ac Becerra  [address]  [place] [facility] [address]        Dear Colleague,    Thank you for referring your patient, Ac Becerra, to the [facility] [place]. Please see a copy of my visit note below.    Chief Complaint:   Chief Complaint   Patient presents with    Consult     Open tuft fracture distal phalanx of Left thumb DOI: 5/17/23       Date of Injury: 5/17/2023  Mechanism of Injury: table saw  Diagnosis: Left thumb tip laceration  Treatment: Laceration repair in emergency department    History of Present Illness: Ac Becerra is a 37 year old RHD male presenting for evaluation of left thumb.    He injured his thumb on a table saw at work.  He was prescribed antibiotics which he is taking.  His wife is a nurse and has been helping him with wound care.  He denies drainage, fever or chills.    Clinical documentation by Dr. Bran on 5/17/2023 was reviewed.    Occupation: [employer]    Past Medical History:   Past Medical History:   Diagnosis Date    NO ACTIVE PROBLEMS        Past Surgical History:   Past Surgical History:   Procedure Laterality Date    NO HISTORY OF SURGERY         Medications:   Current Outpatient Medications:     cephALEXin (KEFLEX) 500 MG capsule, Take 1 capsule (500 mg) by mouth 4 times daily for 7 days, Disp: 28 capsule, Rfl: 0    FLUoxetine (PROZAC) 10 MG capsule, TAKE 1 CAPSULE BY MOUTH EVERY DAY, Disp: 90 capsule, Rfl: 0    tretinoin (RETIN-A) 0.05 % external cream, Apply topically At Bedtime, Disp: 45 g, Rfl: 6    Allergy: No Known Allergies    Social History:   History   Smoking Status    Never   Smokeless Tobacco    Never      Social History     Tobacco Use    Smoking status: Never    Smokeless tobacco: Never   Vaping Use    Vaping status: Never Used   Substance Use Topics    Alcohol use: Yes     Comment: occ    Drug use: No        Family History:   Family History   Problem Relation Age of Onset    No Known Problems Mother      No Known Problems Father         unknown    No Known Problems Brother     Diabetes Maternal Grandmother     Cancer Maternal Grandfather         throat; cancer    No Known Problems Brother        Physical Examination:  There were no vitals filed for this visit.  There is no height or weight on file to calculate BMI.    Well appearing, well nourished  Alert and oriented x 3, cooperative with exam     Left thumb  Laceration at distal thumb with dried blood/eschar, nylon sutures in place  ROM IP joint 0-20, limited by pain and stiffness  Motor Exam: Intact TU/OK/x2-3  Sensory Exam: Sensation intact to light touch in FDWS (radial), volar IF (median), volar SF (ulnar), intact to light touch at the tip of the thumb  Vascular Exam: Fingers warm and well-perfused    Imaging/Studies:  XR (3 views) of the left thumb was obtained 5/24/2023.  I reviewed the images with the patient.  The imaging study shows nondisplaced tuft fracture.    XR (3 views) of the left thumb was obtained 5/17/2023.  I reviewed the images with the patient.  The imaging study shows nondisplaced tuft fracture.    Assessment: Ac Becerra is a 37 year old male with left thumb laceration and open tuft fracture.    Plan:   I had a discussion with the patient regarding my clinical findings, diagnosis, and treatment plan.  The nylon sutures were removed today except for one suture that is buried under the eschar.  I recommend beginning normal hand hygiene and once the remaining suture is visible, that will be removed.  I discussed that the eschar may fall off which could result in a wound.  If that were to occur, I recommend healing by secondary intention.  I described the wound care that would be required which is daily antibiotic ointment followed by a Band-Aid.  The patient verbalized understanding.  All questions answered.     Normal daily hand hygiene  - Tip protector splint provided for comfort  - Complete antibiotics as prescribed  - Continued  monitoring of wound    Next Visit:   Follow-up: 2 week(s).   Imaging: None.   Plan: Wound check, final suture removal.      WESTON GAMBOA MD

## 2023-06-07 ENCOUNTER — VIRTUAL VISIT (OUTPATIENT)
Dept: ORTHOPEDICS | Facility: CLINIC | Age: 38
End: 2023-06-07
Payer: OTHER MISCELLANEOUS

## 2023-06-07 DIAGNOSIS — S62.522B OPEN FRACTURE OF TUFT OF DISTAL PHALANX OF LEFT THUMB: Primary | ICD-10-CM

## 2023-06-07 PROCEDURE — 99213 OFFICE O/P EST LOW 20 MIN: CPT | Mod: VID | Performed by: STUDENT IN AN ORGANIZED HEALTH CARE EDUCATION/TRAINING PROGRAM

## 2023-06-07 NOTE — LETTER
6/7/2023         RE: Ac Becerra  [address]  [place] [address]        Dear Colleague,    Thank you for referring your patient, Ac Becerra, to the [facility] [place]. Please see a copy of my visit note below.    Chief Complaint:   No chief complaint on file.      Date of Injury: 5/17/2023  Mechanism of Injury: table saw  Diagnosis: Left thumb tip laceration  Treatment: Laceration repair in emergency department    History of Present Illness: Ac Becerra is a 37 year old RHD male presenting for evaluation of left thumb.    He injured his thumb on a table saw at work.  He was prescribed antibiotics which he is taking.  His wife is a nurse and has been helping him with wound care.  He denies drainage, fever or chills.    Clinical documentation by Dr. Bran on 5/17/2023 was reviewed.    Interval 6/7/2023: video visit, start time approximately 4:00pm, end time approximately 4:05pm.    Occupation: [employer]    Physical Examination:  There were no vitals filed for this visit.  There is no height or weight on file to calculate BMI.    Well appearing, well nourished  Alert and oriented x 3, cooperative with exam     Left thumb  Laceration at distal thumb with eschar  ROM IP joint 0-40  Vascular Exam: Fingers appear well-perfused on video examination outside the areas of eschar    Imaging/Studies:  XR (3 views) of the left thumb was obtained 5/24/2023.  I reviewed the images with the patient.  The imaging study shows nondisplaced tuft fracture.    XR (3 views) of the left thumb was obtained 5/17/2023.  I reviewed the images with the patient.  The imaging study shows nondisplaced tuft fracture.    Assessment: Ac Becerra is a 37 year old male with left thumb laceration and open tuft fracture.    Plan:   I had a discussion with the patient regarding my clinical findings, diagnosis, and treatment plan.  I recommend continuing normal hand hygiene.  I discussed that the  eschar will fall off which could result in a wound.  If that were to occur, I recommend healing by secondary intention.  I described the wound care that would be required which is daily antibiotic ointment followed by a Band-Aid.  The patient verbalized understanding.  All questions answered.     Normal daily hand hygiene  - Tip protector splint for comfort    Next Visit:   Follow-up: send photo after eschar falls off      WESTON GAMBOA MD

## 2023-06-07 NOTE — PROGRESS NOTES
Chief Complaint:   No chief complaint on file.      Date of Injury: 5/17/2023  Mechanism of Injury: table saw  Diagnosis: Left thumb tip laceration  Treatment: Laceration repair in emergency department    History of Present Illness: Ac Becerra is a 37 year old RHD male presenting for evaluation of left thumb.    He injured his thumb on a table saw at work.  He was prescribed antibiotics which he is taking.  His wife is a nurse and has been helping him with wound care.  He denies drainage, fever or chills.    Clinical documentation by Dr. Bran on 5/17/2023 was reviewed.    Interval 6/7/2023: video visit, start time approximately 4:00pm, end time approximately 4:05pm.    Occupation: [employer]    Physical Examination:  There were no vitals filed for this visit.  There is no height or weight on file to calculate BMI.    Well appearing, well nourished  Alert and oriented x 3, cooperative with exam     Left thumb  Laceration at distal thumb with eschar  ROM IP joint 0-40  Vascular Exam: Fingers appear well-perfused on video examination outside the areas of eschar    Imaging/Studies:  XR (3 views) of the left thumb was obtained 5/24/2023.  I reviewed the images with the patient.  The imaging study shows nondisplaced tuft fracture.    XR (3 views) of the left thumb was obtained 5/17/2023.  I reviewed the images with the patient.  The imaging study shows nondisplaced tuft fracture.    Assessment: Ac Becerra is a 37 year old male with left thumb laceration and open tuft fracture.    Plan:   I had a discussion with the patient regarding my clinical findings, diagnosis, and treatment plan.  I recommend continuing normal hand hygiene.  I discussed that the eschar will fall off which could result in a wound.  If that were to occur, I recommend healing by secondary intention.  I described the wound care that would be required which is daily antibiotic ointment followed by a Band-Aid.  The patient verbalized  understanding.  All questions answered.      Normal daily hand hygiene  - Tip protector splint for comfort    Next Visit:    Follow-up: send photo after eschar falls off      WESTON GAMBOA MD

## 2023-07-23 DIAGNOSIS — F41.9 ANXIETY: ICD-10-CM

## 2023-07-24 RX ORDER — FLUOXETINE 10 MG/1
CAPSULE ORAL
Qty: 90 CAPSULE | Refills: 0 | Status: SHIPPED | OUTPATIENT
Start: 2023-07-24 | End: 2023-10-07

## 2023-08-05 ENCOUNTER — OFFICE VISIT (OUTPATIENT)
Dept: URGENT CARE | Facility: URGENT CARE | Age: 38
End: 2023-08-05
Payer: COMMERCIAL

## 2023-08-05 VITALS
RESPIRATION RATE: 15 BRPM | OXYGEN SATURATION: 97 % | DIASTOLIC BLOOD PRESSURE: 83 MMHG | HEART RATE: 74 BPM | BODY MASS INDEX: 27.1 KG/M2 | TEMPERATURE: 97.3 F | WEIGHT: 199.8 LBS | SYSTOLIC BLOOD PRESSURE: 123 MMHG

## 2023-08-05 DIAGNOSIS — J02.9 SORE THROAT: ICD-10-CM

## 2023-08-05 DIAGNOSIS — J02.0 STREP THROAT: Primary | ICD-10-CM

## 2023-08-05 LAB — DEPRECATED S PYO AG THROAT QL EIA: POSITIVE

## 2023-08-05 PROCEDURE — 87880 STREP A ASSAY W/OPTIC: CPT | Performed by: NURSE PRACTITIONER

## 2023-08-05 PROCEDURE — 99213 OFFICE O/P EST LOW 20 MIN: CPT | Performed by: NURSE PRACTITIONER

## 2023-08-05 RX ORDER — PENICILLIN V POTASSIUM 500 MG/1
500 TABLET, FILM COATED ORAL 2 TIMES DAILY
Qty: 20 TABLET | Refills: 0 | Status: SHIPPED | OUTPATIENT
Start: 2023-08-05 | End: 2023-08-15

## 2023-08-05 ASSESSMENT — PAIN SCALES - GENERAL: PAINLEVEL: NO PAIN (0)

## 2023-08-05 NOTE — PROGRESS NOTES
Assessment & Plan     Strep throat    - penicillin V (VEETID) 500 MG tablet  Dispense: 20 tablet; Refill: 0    Sore throat    - Streptococcus A Rapid Screen w/Reflex to PCR - Clinic Collect     Reviewed positive rapid strep results during visit. Prescription sent to pharmacy for penicillin twice daily for 10 days. Recommended rest, fluids, tylenol as needed, gargle warm salt water, throat lozenges, chloraseptic spray. Change toothbrush after 24 hours on antibiotic. Discussed risk for peritonsillar abscess formation. COVID testing declined.    Follow-up with PCP if symptoms persist for 5 days, and sooner if symptoms worsen or new symptoms develop.     Discussed red flag symptoms which warrant immediate visit in emergency room    All questions were answered and patient verbalized understanding. AVS reviewed with patient.     Janel Castaneda, LARRY, APRN, CNP 8/5/2023 10:15 AM  [facility] URGENT CARE [facility]     Ac is a 37 year old male who presents to clinic today for the following health issues:  Chief Complaint   Patient presents with    Pharyngitis     Since wed night, fevers and sore throat, both ear pain      Patient presents for evaluation of sore throat. Symptoms started 3 days ago and have been worsening. Associated symptoms: fever, bilateral ear pain. Pain has been moderate. Fever was 101.4F. He has been taking ibuprofen which helps temporarily. He has been able to drink fluids and swallow without difficulty. Denies headache, rash, emesis, cough. No known exposures.     Problem list, Medication list, Allergies, and Medical history reviewed in EPIC.    ROS:  Review of systems negative except for noted above        Objective    /83   Pulse 74   Temp 97.3  F (36.3  C) (Tympanic)   Resp 15   Wt 90.6 kg (199 lb 12.8 oz)   SpO2 97%   BMI 27.10 kg/m    Physical Exam  Constitutional:       General: He is not in acute distress.     Appearance: He is not toxic-appearing or diaphoretic.    HENT:      Head: Normocephalic and atraumatic.      Right Ear: Tympanic membrane, ear canal and external ear normal.      Left Ear: Tympanic membrane, ear canal and external ear normal.      Nose: Nose normal.      Mouth/Throat:      Mouth: Mucous membranes are moist.      Pharynx: Oropharynx is clear. Posterior oropharyngeal erythema present.      Tonsils: Tonsillar exudate present. No tonsillar abscesses. 3+ on the right. 3+ on the left.      Comments: Small amount White exudate right tonsil  Eyes:      Conjunctiva/sclera: Conjunctivae normal.   Cardiovascular:      Rate and Rhythm: Normal rate and regular rhythm.      Heart sounds: Normal heart sounds.   Pulmonary:      Effort: Pulmonary effort is normal. No respiratory distress.      Breath sounds: Normal breath sounds. No wheezing, rhonchi or rales.   Lymphadenopathy:      Cervical: No cervical adenopathy.   Skin:     General: Skin is warm and dry.   Neurological:      Mental Status: He is alert.          Labs:  Results for orders placed or performed in visit on 08/05/23   Streptococcus A Rapid Screen w/Reflex to PCR - Clinic Collect     Status: Abnormal    Specimen: Throat; Swab   Result Value Ref Range    Group A Strep antigen Positive (A) Negative

## 2023-09-18 ENCOUNTER — E-VISIT (OUTPATIENT)
Dept: URGENT CARE | Facility: CLINIC | Age: 38
End: 2023-09-18
Payer: COMMERCIAL

## 2023-09-18 DIAGNOSIS — R21 RASH AND NONSPECIFIC SKIN ERUPTION: Primary | ICD-10-CM

## 2023-09-18 PROCEDURE — 99207 PR NON-BILLABLE SERV PER CHARTING: CPT | Performed by: FAMILY MEDICINE

## 2023-09-18 NOTE — PATIENT INSTRUCTIONS
Dear Ac Becerra,    We are sorry you are not feeling well. Based on the responses you provided, it is recommended that you be seen in-person in urgent care so we can better evaluate your symptoms. Please click here to find the nearest urgent care location to you.   You will not be charged for this Visit. Thank you for trusting us with your care.    Etelvina Hogan MD

## 2023-09-19 ENCOUNTER — OFFICE VISIT (OUTPATIENT)
Dept: URGENT CARE | Facility: URGENT CARE | Age: 38
End: 2023-09-19
Payer: COMMERCIAL

## 2023-09-19 VITALS
WEIGHT: 201 LBS | TEMPERATURE: 98.1 F | OXYGEN SATURATION: 97 % | SYSTOLIC BLOOD PRESSURE: 131 MMHG | HEART RATE: 84 BPM | BODY MASS INDEX: 27.26 KG/M2 | DIASTOLIC BLOOD PRESSURE: 84 MMHG | RESPIRATION RATE: 16 BRPM

## 2023-09-19 DIAGNOSIS — J02.0 STREPTOCOCCAL PHARYNGITIS: ICD-10-CM

## 2023-09-19 DIAGNOSIS — L40.4 ACUTE GUTTATE PSORIASIS: Primary | ICD-10-CM

## 2023-09-19 LAB — DEPRECATED S PYO AG THROAT QL EIA: POSITIVE

## 2023-09-19 PROCEDURE — 87880 STREP A ASSAY W/OPTIC: CPT | Performed by: EMERGENCY MEDICINE

## 2023-09-19 PROCEDURE — 99214 OFFICE O/P EST MOD 30 MIN: CPT | Performed by: EMERGENCY MEDICINE

## 2023-09-19 RX ORDER — TRIAMCINOLONE ACETONIDE 1 MG/G
CREAM TOPICAL 2 TIMES DAILY
Qty: 453.6 G | Refills: 0 | Status: SHIPPED | OUTPATIENT
Start: 2023-09-19 | End: 2023-10-03

## 2023-09-19 RX ORDER — CEPHALEXIN 500 MG/1
500 CAPSULE ORAL 2 TIMES DAILY
Qty: 20 CAPSULE | Refills: 0 | Status: SHIPPED | OUTPATIENT
Start: 2023-09-19 | End: 2023-09-29

## 2023-09-19 NOTE — PROGRESS NOTES
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 Streptococcal pharyngitis  J02.0         Medical Decision Making:  Ac Becerra is a 38 year old male presents today with concern of a rash.  The rash is blanching and non-petechial, non-pruritic.  The patient is nontoxic and well-appearing.  I suspect this is a rash due to recent strep infection or penicillin use; diagnosis is acute guttate psoriasis. Triamcinolone 0.1% cream BID x2 weeks and close follow up with dermatology; referral placed. He is asymptomatic from a pharyngitis perspective, but his rapid strep test did come back positive here.  Will treat with Keflex since Penicillin either failed and/or could be contributing to his symptoms. Symptomatic care is recommended as needed per discharge instructions.  Close follow-up with the patient's PMD was recommended.  I also recommended return if any worsening, high fever, sores in the mouth, difficulty breathing or any worsening.      30 minutes spent by me on the date of the encounter doing chart review, review of outside records, review of test results, interpretation of tests, patient visit, and documentation     Romario Scott PA-C  [facility] URGENT CARE    Subjective     Ac Becerra is a 38 year old male who presents to clinic today for the following health issues:  Chief Complaint   Patient presents with    Urgent Care     Red bumps/rash over various parts of body since completion of pcn antibiotic on 08/14/2023, but symptoms has now become worse.        HPI  Patient reports of a red bumpy itchy and dry rash all over his body that started on about a month ago after strep infection and 10-day course of penicillin.  He did do 15 days of prednisone which seemed to help with the symptoms, since being off that medication he slowly  noticed the rash getting worse, it is mainly on his abdomen and is quite significant.  As he does drink alcohol about a liter every week, does feel more itchy on the weekdays following his drinking binge.  He denies any new sore throat, fever, cough, joint pains, recent tick bites, history of psoriasis or eczema, new skin care products, detergents, other allergens.    Review of Systems    See HPI    Objective      Vitals: /84   Pulse 84   Temp 98.1  F (36.7  C) (Tympanic)   Resp 16   Wt 91.2 kg (201 lb)   SpO2 97%   BMI 27.26 kg/m        Patient Vitals for the past 24 hrs:   BP Temp Temp src Pulse Resp SpO2 Weight   09/19/23 1725 131/84 98.1  F (36.7  C) Tympanic 84 16 97 % 91.2 kg (201 lb)       Vital signs reviewed by: Romario Scott PA-C    Physical Exam   Constitutional: Patient is alert and cooperative. No acute distress.  Mouth: Mucous membranes are moist. Normal tongue and tonsil. Posterior oropharynx is clear.  Eyes: Conjunctivae, EOMI and lids are normal.  Cardiovascular: Regular rate and rhythm  Pulmonary/Chest: Lungs are clear to auscultation throughout. Effort normal. No respiratory distress. No wheezes, rales or rhonchi.  GI: Abdomen is soft and non-tender throughout. No CVA tenderness bilaterally.  Neurological: Alert and oriented x3.   Skin: Diffuse erythematous small dry scaly plaques all over the abdomen, few scattered on the extremities.  Blanches normally.  No petechiae, purpura, vesicles or blisters.  Psychiatric:The patient has a normal mood and affect.     Labs/Imaging:  Results for orders placed or performed in visit on 09/19/23   Streptococcus A Rapid Screen w/Reflex to PCR     Status: Abnormal    Specimen: Throat; Swab   Result Value Ref Range    Group A Strep antigen Positive (A) Negative         Romario Scott PA-C, September 19, 2023

## 2023-10-07 ENCOUNTER — MYC REFILL (OUTPATIENT)
Dept: FAMILY MEDICINE | Facility: CLINIC | Age: 38
End: 2023-10-07
Payer: COMMERCIAL

## 2023-10-07 DIAGNOSIS — F41.9 ANXIETY: ICD-10-CM

## 2023-10-09 RX ORDER — FLUOXETINE 10 MG/1
CAPSULE ORAL
Qty: 90 CAPSULE | Refills: 0 | OUTPATIENT
Start: 2023-10-09

## 2023-10-09 RX ORDER — FLUOXETINE 10 MG/1
10 CAPSULE ORAL DAILY
Qty: 90 CAPSULE | Refills: 0 | Status: SHIPPED | OUTPATIENT
Start: 2023-10-09 | End: 2024-01-08

## 2023-11-09 ENCOUNTER — OFFICE VISIT (OUTPATIENT)
Dept: DERMATOLOGY | Facility: CLINIC | Age: 38
End: 2023-11-09
Payer: COMMERCIAL

## 2023-11-09 DIAGNOSIS — L40.4 GUTTATE PSORIASIS: Primary | ICD-10-CM

## 2023-11-09 PROCEDURE — 99214 OFFICE O/P EST MOD 30 MIN: CPT | Performed by: NURSE PRACTITIONER

## 2023-11-09 RX ORDER — CLOBETASOL PROPIONATE 0.5 MG/G
OINTMENT TOPICAL 2 TIMES DAILY
Qty: 120 G | Refills: 3 | Status: SHIPPED | OUTPATIENT
Start: 2023-11-09

## 2023-11-09 NOTE — PATIENT INSTRUCTIONS
V/S: VSS, afebrile. -120s. HR 60-90s.  Neuro: Pt is A&O x4, forgetful at times. No c/o headache & lightheadedness. No c/o numbness & tingling, calls appropriately.  Resp: 2L NC. Sats > 95, no c/o SOB, has KEYES. Lung sounds clear w/ expiratory wheezes in all lobes in R & L lungs (provider notified, prn albuterol ordered)  Cardiac: No tele orders, HR SR. No c/o chest pain & palpitations.  GI/: No BG checks. Easy to chew (level 7) diet, tolerating well. No FR. No c/o n/v/d. Voiding adequately via bedside. Last BM 10/21/2023.  Skin: Skin bruised on abdomen (large hematoma, team aware), site marked. No new deficits noted.  Pain: C/o abdominal pain, given x1 tylenol.  Activity: Assist x1 w/ walker in room & in hallways.  Electrolytes: Awaiting lab results.  LDAs: LUQ PEG tube open to gravity drainage bad, WDL. X7 laparoscopic sites approximated & WDL (see flowsheets), abdominal binder in use. R single lumen chest port heparin locked & WDL. R PIV SL & WDL. L PIV SL & WDL.     I was unable to do 0300 VS & assessment d/t emergent code blue with one of my other patients (see flowsheets).    Plan of care ongoing.  Patient currently resting in bed with call light in reach.    You have been prescribed narrow band UVB phototherapy (light treatments) to treat your rash. Please check insurance coverage prior to scheduling appointments to make sure this is a covered service.     Insurance will want to know your:  Diagnosis code: Guttate psoriasis (L40.4)  Procedure code:  45395 (narrow band UVB phototherapy)     To set up photo therapy appointments please call [phone]     Purchase mineral oil and apply prior to phototherapy sessions. Sunscreen or cover genitals and face.     Apply clobetasol ointment twice daily for 1 month to most bothersome areas or stubborn areas. Decrease to once daily after 1 month.

## 2023-11-09 NOTE — PROGRESS NOTES
[facility] Dermatology Note  Encounter Date: Nov 9, 2023  Office Visit     Reviewed patients past medical history and pertinent chart review prior to patients visit today.     Dermatology Problem List:  Guttate psoriasis     ____________________________________________    Assessment & Plan:     Guttate psoriasis  -Patient would like to proceed with narrowband UVB phototherapy. I gave patient diagnosis and procedure codes to check insurance coverage before proceeding with making appointments.  I would like patient to be seen 3 times weekly if possible for treatments.  Phone number given to schedule appointments after checking insurance coverage.     -Clobetasol 0.05% ointment BID to areas that are bothering him the most. Councled about use and side effects of thinning of the skin, striae, erythema, and worsening of rash.       Follow up in 3 months    Rosanne Zavala CNP  Dermatology    _______________________________________    CC: Psoriasis (Unsure if actual psoriasis. Started after getting strep in aug 2023, looked like hives all over body. Tried and failed prednisone, triamcinolone, clobetasol- reduced symptoms but didn't clear rash. )    HPI:  Mr. Ac Becerra is a(n) 38 year old male who presents today as a return patient for a full body rash.  It spares the face and the groin.  He got strep in August 2023, was put on penicillin to treat the strep and then got some red bumps and rash all over.  He was put on some prednisone but this did not seem to be very helpful.  He also has had triamcinolone and clobetasol ointment which he has used sporadically but did not seem to be helpful.  He is unsure if this is psoriasis or not.  It itches a little bit when he gets out of the shower but for the rest of the day it is not bothersome.    Patient is otherwise feeling well, without additional skin concerns.      Physical Exam:  SKIN: Full skin, which includes the head/face, both arms, chest, back,  abdomen,both legs, genitalia and/or groin buttocks, digits and/or nails, was examined.  -Pink scaly well-defined plaques covering all 4 extremities and the entire trunk    - No other lesions of concern on areas examined.     Medications:  Current Outpatient Medications   Medication    FLUoxetine (PROZAC) 10 MG capsule    tretinoin (RETIN-A) 0.05 % external cream     No current facility-administered medications for this visit.      Past Medical History:   Patient Active Problem List   Diagnosis    Right groin pain    Rash and nonspecific skin eruption    Blood in semen    Elevated cholesterol    Glucosuria    Laceration of right hand without foreign body, subsequent encounter     Past Medical History:   Diagnosis Date    NO ACTIVE PROBLEMS        CC No referring provider defined for this encounter. on close of this encounter.

## 2023-11-09 NOTE — LETTER
11/9/2023         RE: Ac Becerra  [address] [place] [facility] [address]        Dear Colleague,    Thank you for referring your patient, Ac Becerra, to the [facility]. Please see a copy of my visit note below.    [facility] Dermatology Note  Encounter Date: Nov 9, 2023  Office Visit     Reviewed patients past medical history and pertinent chart review prior to patients visit today.     Dermatology Problem List:  Guttate psoriasis     ____________________________________________    Assessment & Plan:     Guttate psoriasis  -Patient would like to proceed with narrowband UVB phototherapy. I gave patient diagnosis and procedure codes to check insurance coverage before proceeding with making appointments.  I would like patient to be seen 3 times weekly if possible for treatments.  Phone number given to schedule appointments after checking insurance coverage.     -Clobetasol 0.05% ointment BID to areas that are bothering him the most. Councled about use and side effects of thinning of the skin, striae, erythema, and worsening of rash.       Follow up in 3 months    Rosanne Zavala CNP  Dermatology    _______________________________________    CC: Psoriasis (Unsure if actual psoriasis. Started after getting strep in aug 2023, looked like hives all over body. Tried and failed prednisone, triamcinolone, clobetasol- reduced symptoms but didn't clear rash. )    HPI:  Mr. Ac Becerra is a(n) 38 year old male who presents today as a return patient for a full body rash.  It spares the face and the groin.  He got strep in August 2023, was put on penicillin to treat the strep and then got some red bumps and rash all over.  He was put on some prednisone but this did not seem to be very helpful.  He also has had triamcinolone and clobetasol ointment which he has used sporadically but did not seem to be helpful.  He is unsure if this is psoriasis or not.  It itches a little bit when  he gets out of the shower but for the rest of the day it is not bothersome.    Patient is otherwise feeling well, without additional skin concerns.      Physical Exam:  SKIN: Full skin, which includes the head/face, both arms, chest, back, abdomen,both legs, genitalia and/or groin buttocks, digits and/or nails, was examined.  -Pink scaly well-defined plaques covering all 4 extremities and the entire trunk    - No other lesions of concern on areas examined.     Medications:  Current Outpatient Medications   Medication     FLUoxetine (PROZAC) 10 MG capsule     tretinoin (RETIN-A) 0.05 % external cream     No current facility-administered medications for this visit.      Past Medical History:   Patient Active Problem List   Diagnosis     Right groin pain     Rash and nonspecific skin eruption     Blood in semen     Elevated cholesterol     Glucosuria     Laceration of right hand without foreign body, subsequent encounter     Past Medical History:   Diagnosis Date     NO ACTIVE PROBLEMS        CC No referring provider defined for this encounter. on close of this encounter.       Again, thank you for allowing me to participate in the care of your patient.        Sincerely,        AMIRA Barrera CNP

## 2024-01-06 DIAGNOSIS — F41.9 ANXIETY: ICD-10-CM

## 2024-01-08 RX ORDER — FLUOXETINE 10 MG/1
10 CAPSULE ORAL DAILY
Qty: 90 CAPSULE | Refills: 0 | Status: SHIPPED | OUTPATIENT
Start: 2024-01-08 | End: 2024-06-10

## 2024-02-10 ENCOUNTER — HEALTH MAINTENANCE LETTER (OUTPATIENT)
Age: 39
End: 2024-02-10

## 2024-03-11 ENCOUNTER — E-VISIT (OUTPATIENT)
Dept: FAMILY MEDICINE | Facility: CLINIC | Age: 39
End: 2024-03-11
Payer: COMMERCIAL

## 2024-03-11 DIAGNOSIS — R69 DIAGNOSIS DEFERRED: Primary | ICD-10-CM

## 2024-03-11 PROCEDURE — 99207 PR NON-BILLABLE SERV PER CHARTING: CPT | Performed by: PHYSICIAN ASSISTANT

## 2024-05-03 DIAGNOSIS — F41.9 ANXIETY: ICD-10-CM

## 2024-05-03 RX ORDER — FLUOXETINE 10 MG/1
10 CAPSULE ORAL DAILY
Qty: 90 CAPSULE | Refills: 0 | OUTPATIENT
Start: 2024-05-03

## 2024-05-03 NOTE — TELEPHONE ENCOUNTER
Greater than 15 months since LOV.  No PCP listed, routed to last provider seen.    Samantha Andres RN, BSN  [facility]

## 2024-06-09 DIAGNOSIS — F41.9 ANXIETY: ICD-10-CM

## 2024-06-10 RX ORDER — FLUOXETINE 10 MG/1
10 CAPSULE ORAL DAILY
Qty: 90 CAPSULE | Refills: 0 | Status: SHIPPED | OUTPATIENT
Start: 2024-06-10 | End: 2024-09-13

## 2024-06-25 ENCOUNTER — PATIENT OUTREACH (OUTPATIENT)
Dept: CARE COORDINATION | Facility: CLINIC | Age: 39
End: 2024-06-25
Payer: COMMERCIAL

## 2024-07-15 ENCOUNTER — OFFICE VISIT (OUTPATIENT)
Dept: URGENT CARE | Facility: URGENT CARE | Age: 39
End: 2024-07-15
Payer: COMMERCIAL

## 2024-07-15 VITALS
BODY MASS INDEX: 27.4 KG/M2 | RESPIRATION RATE: 18 BRPM | OXYGEN SATURATION: 99 % | DIASTOLIC BLOOD PRESSURE: 85 MMHG | WEIGHT: 202 LBS | SYSTOLIC BLOOD PRESSURE: 152 MMHG | HEART RATE: 72 BPM | TEMPERATURE: 97.1 F

## 2024-07-15 DIAGNOSIS — R16.0 HEPATOMEGALY: ICD-10-CM

## 2024-07-15 DIAGNOSIS — D17.30 LIPOMA OF SKIN AND SUBCUTANEOUS TISSUE: Primary | ICD-10-CM

## 2024-07-15 PROCEDURE — 36415 COLL VENOUS BLD VENIPUNCTURE: CPT | Performed by: STUDENT IN AN ORGANIZED HEALTH CARE EDUCATION/TRAINING PROGRAM

## 2024-07-15 PROCEDURE — 80053 COMPREHEN METABOLIC PANEL: CPT | Performed by: STUDENT IN AN ORGANIZED HEALTH CARE EDUCATION/TRAINING PROGRAM

## 2024-07-15 PROCEDURE — 99203 OFFICE O/P NEW LOW 30 MIN: CPT | Performed by: STUDENT IN AN ORGANIZED HEALTH CARE EDUCATION/TRAINING PROGRAM

## 2024-07-15 NOTE — PATIENT INSTRUCTIONS
Good seeing you in UC clinic today. I think you have a lipoma which usually is benign. I ordered an US of the area to make sure. I also got electrolytes and liver tests and a liver ultrasound. I would recommend not drinking more than 2 drinks per day in a week, and never more than 3 drinks in 1 day to protect your liver.

## 2024-07-15 NOTE — PROGRESS NOTES
Assessment & Plan     Lipoma of skin and subcutaneous tissue  Patient with likely benign lipoma overlying lower chest wall. Well-circumscribed mass without concerning features, will obtain soft tissue ultrasound to ensure benign.  - US Soft Tissue Chest Wall or Upper Back    Hepatomegaly  Unclear etiology of patient's mild hepatomegaly, given significant alcohol use would perform some basic workup of alcoholic hepatitis.  -Decreasing alcohol intake to currently recommended guidelines of less than 14 drinks per week and less than 3 drinks per day.  - Comprehensive metabolic panel (BMP + Alb, Alk Phos, ALT, AST, Total. Bili, TP)  - Follow-up with new PCP on 7/23  - US Abdomen Limited  - Comprehensive metabolic panel (BMP + Alb, Alk Phos, ALT, AST, Total. Bili, TP)    Return if symptoms worsen or fail to improve.    Ritu Huntley MD  [facility]     Ac is a 38 year old male who presents to clinic today for the following health issues:  Chief Complaint   Patient presents with    Urgent Care    Derm Problem     Per patient states that a week ago Saturday he noticed a lump on his right side rib cage, states lump is soft but has never had one before          7/15/2024     4:41 PM   Additional Questions   Roomed by JESS Henry   Accompanied by Self     HPI    Lump on his right side over his ribs which is new, that he first noticed just over a week ago. Initially sore but now painless.     Patient also notes more fullness in the right upper quadrant. Patient has not been having any right upper quadrant pain, unexpected weight loss, jaundice. He does note that he drinks about 20 drinks a week usually binge drinking over the weekend. Reports that he has been doing this for about 10 years.    Denies fevers, chills, night sweats, unexplained weight loss.         Objective    BP (!) 152/85   Pulse 72   Temp 97.1  F (36.2  C) (Tympanic)   Resp 18   Wt 91.6 kg (202 lb)   SpO2 99%    BMI 27.40 kg/m    Physical Exam   Constitutional: No Acute Distress. Awake and alert  Eyes: anicteric, EOMI, PERRLA  ENT: oropharynx clear, MMM, no Cervical LAD  Respiratory: good air movement, clear to auscultation bilaterally, no crackles or wheezing  Cardiovascular: regular rate and rhythm, normal S1 and S2, no murmur noted  GI: normal bowel sounds, soft, non-distended, non-tender, no masses palpated, mild hepatomegaly, no splenomegaly  Skin: 3 x 3 cm well-circumscribed, mobile lesion on the right anterior chest wall near the inferior aspect of the ribs. Otherwise, no rashes, or suspicious lesions  Musculoskeletal: No pedal edema  Neurologic: no focal neurologic deficits appreciated    No results found for this or any previous visit (from the past 24 hour(s)).

## 2024-07-16 LAB
ALBUMIN SERPL BCG-MCNC: 5.1 G/DL (ref 3.5–5.2)
ALP SERPL-CCNC: 108 U/L (ref 40–150)
ALT SERPL W P-5'-P-CCNC: 26 U/L (ref 0–70)
ANION GAP SERPL CALCULATED.3IONS-SCNC: 12 MMOL/L (ref 7–15)
AST SERPL W P-5'-P-CCNC: 29 U/L (ref 0–45)
BILIRUB SERPL-MCNC: 0.6 MG/DL
BUN SERPL-MCNC: 12.4 MG/DL (ref 6–20)
CALCIUM SERPL-MCNC: 9.7 MG/DL (ref 8.8–10.4)
CHLORIDE SERPL-SCNC: 101 MMOL/L (ref 98–107)
CREAT SERPL-MCNC: 1.1 MG/DL (ref 0.67–1.17)
EGFRCR SERPLBLD CKD-EPI 2021: 89 ML/MIN/1.73M2
GLUCOSE SERPL-MCNC: 106 MG/DL (ref 70–99)
HCO3 SERPL-SCNC: 25 MMOL/L (ref 22–29)
POTASSIUM SERPL-SCNC: 4.3 MMOL/L (ref 3.4–5.3)
PROT SERPL-MCNC: 8 G/DL (ref 6.4–8.3)
SODIUM SERPL-SCNC: 138 MMOL/L (ref 135–145)

## 2024-07-17 ENCOUNTER — ANCILLARY PROCEDURE (OUTPATIENT)
Dept: ULTRASOUND IMAGING | Facility: CLINIC | Age: 39
End: 2024-07-17
Attending: STUDENT IN AN ORGANIZED HEALTH CARE EDUCATION/TRAINING PROGRAM
Payer: COMMERCIAL

## 2024-07-17 ENCOUNTER — TELEPHONE (OUTPATIENT)
Dept: URGENT CARE | Facility: URGENT CARE | Age: 39
End: 2024-07-17

## 2024-07-17 DIAGNOSIS — R16.0 HEPATOMEGALY: ICD-10-CM

## 2024-07-17 DIAGNOSIS — D17.30 LIPOMA OF SKIN AND SUBCUTANEOUS TISSUE: ICD-10-CM

## 2024-07-17 PROCEDURE — 76705 ECHO EXAM OF ABDOMEN: CPT | Mod: TC | Performed by: RADIOLOGY

## 2024-07-17 PROCEDURE — 76604 US EXAM CHEST: CPT | Mod: TC | Performed by: RADIOLOGY

## 2024-07-17 NOTE — TELEPHONE ENCOUNTER
Called the patient to discuss his ultrasound results from his visit on 7/15/24. His soft tissue ultrasound suggests a benign process like a lipoma and discussed that if it progresses he should seek care for consideration of removal. His abdominal ultrasound showed a focal region that was indeterminate on imaging and liver MRI was recommended. I discussed that he should follow up with his PCP for that test to be ordered and followed up.     Shauna Ramirez MD

## 2024-07-19 ENCOUNTER — OFFICE VISIT (OUTPATIENT)
Dept: FAMILY MEDICINE | Facility: CLINIC | Age: 39
End: 2024-07-19
Payer: COMMERCIAL

## 2024-07-19 VITALS
HEART RATE: 70 BPM | BODY MASS INDEX: 27.22 KG/M2 | DIASTOLIC BLOOD PRESSURE: 87 MMHG | HEIGHT: 72 IN | WEIGHT: 201 LBS | TEMPERATURE: 98.1 F | RESPIRATION RATE: 16 BRPM | SYSTOLIC BLOOD PRESSURE: 136 MMHG | OXYGEN SATURATION: 97 %

## 2024-07-19 DIAGNOSIS — Z00.00 ROUTINE GENERAL MEDICAL EXAMINATION AT A HEALTH CARE FACILITY: Primary | ICD-10-CM

## 2024-07-19 DIAGNOSIS — K76.0 FATTY LIVER: ICD-10-CM

## 2024-07-19 DIAGNOSIS — K76.9 LIVER LESION: ICD-10-CM

## 2024-07-19 LAB
ALP SERPL-CCNC: 92 U/L (ref 40–150)
APTT PPP: 36 SECONDS (ref 22–38)
BASOPHILS # BLD AUTO: 0 10E3/UL (ref 0–0.2)
BASOPHILS NFR BLD AUTO: 1 %
CHOLEST SERPL-MCNC: 173 MG/DL
EOSINOPHIL # BLD AUTO: 0.2 10E3/UL (ref 0–0.7)
EOSINOPHIL NFR BLD AUTO: 3 %
ERYTHROCYTE [DISTWIDTH] IN BLOOD BY AUTOMATED COUNT: 11.9 % (ref 10–15)
FASTING STATUS PATIENT QL REPORTED: YES
FERRITIN SERPL-MCNC: 153 NG/ML (ref 31–409)
HBA1C MFR BLD: 5 % (ref 0–5.6)
HBV SURFACE AB SERPL IA-ACNC: 41.2 M[IU]/ML
HBV SURFACE AB SERPL IA-ACNC: REACTIVE M[IU]/ML
HBV SURFACE AG SERPL QL IA: NONREACTIVE
HCT VFR BLD AUTO: 46.2 % (ref 40–53)
HCV AB SERPL QL IA: NONREACTIVE
HDLC SERPL-MCNC: 49 MG/DL
HGB BLD-MCNC: 15.7 G/DL (ref 13.3–17.7)
IMM GRANULOCYTES # BLD: 0 10E3/UL
IMM GRANULOCYTES NFR BLD: 0 %
INR PPP: 1.1 (ref 0.85–1.15)
IRON SERPL-MCNC: 103 UG/DL (ref 61–157)
LDLC SERPL CALC-MCNC: 112 MG/DL
LYMPHOCYTES # BLD AUTO: 1.5 10E3/UL (ref 0.8–5.3)
LYMPHOCYTES NFR BLD AUTO: 27 %
MCH RBC QN AUTO: 30.5 PG (ref 26.5–33)
MCHC RBC AUTO-ENTMCNC: 34 G/DL (ref 31.5–36.5)
MCV RBC AUTO: 90 FL (ref 78–100)
MONOCYTES # BLD AUTO: 0.4 10E3/UL (ref 0–1.3)
MONOCYTES NFR BLD AUTO: 8 %
NEUTROPHILS # BLD AUTO: 3.3 10E3/UL (ref 1.6–8.3)
NEUTROPHILS NFR BLD AUTO: 61 %
NONHDLC SERPL-MCNC: 124 MG/DL
PLATELET # BLD AUTO: 193 10E3/UL (ref 150–450)
RBC # BLD AUTO: 5.15 10E6/UL (ref 4.4–5.9)
TRANSFERRIN SERPL-MCNC: 293 MG/DL (ref 200–360)
TRIGL SERPL-MCNC: 62 MG/DL
WBC # BLD AUTO: 5.4 10E3/UL (ref 4–11)

## 2024-07-19 PROCEDURE — 86803 HEPATITIS C AB TEST: CPT | Performed by: FAMILY MEDICINE

## 2024-07-19 PROCEDURE — 85730 THROMBOPLASTIN TIME PARTIAL: CPT | Performed by: FAMILY MEDICINE

## 2024-07-19 PROCEDURE — 86706 HEP B SURFACE ANTIBODY: CPT | Performed by: FAMILY MEDICINE

## 2024-07-19 PROCEDURE — 36415 COLL VENOUS BLD VENIPUNCTURE: CPT | Performed by: FAMILY MEDICINE

## 2024-07-19 PROCEDURE — 85610 PROTHROMBIN TIME: CPT | Performed by: FAMILY MEDICINE

## 2024-07-19 PROCEDURE — 82390 ASSAY OF CERULOPLASMIN: CPT | Performed by: FAMILY MEDICINE

## 2024-07-19 PROCEDURE — 82728 ASSAY OF FERRITIN: CPT | Performed by: FAMILY MEDICINE

## 2024-07-19 PROCEDURE — 86038 ANTINUCLEAR ANTIBODIES: CPT | Performed by: FAMILY MEDICINE

## 2024-07-19 PROCEDURE — 80061 LIPID PANEL: CPT | Performed by: FAMILY MEDICINE

## 2024-07-19 PROCEDURE — 82784 ASSAY IGA/IGD/IGG/IGM EACH: CPT | Performed by: FAMILY MEDICINE

## 2024-07-19 PROCEDURE — 84075 ASSAY ALKALINE PHOSPHATASE: CPT | Performed by: FAMILY MEDICINE

## 2024-07-19 PROCEDURE — 99395 PREV VISIT EST AGE 18-39: CPT | Performed by: FAMILY MEDICINE

## 2024-07-19 PROCEDURE — 83036 HEMOGLOBIN GLYCOSYLATED A1C: CPT | Performed by: FAMILY MEDICINE

## 2024-07-19 PROCEDURE — 99213 OFFICE O/P EST LOW 20 MIN: CPT | Mod: 25 | Performed by: FAMILY MEDICINE

## 2024-07-19 PROCEDURE — 85025 COMPLETE CBC W/AUTO DIFF WBC: CPT | Performed by: FAMILY MEDICINE

## 2024-07-19 PROCEDURE — 82103 ALPHA-1-ANTITRYPSIN TOTAL: CPT | Performed by: FAMILY MEDICINE

## 2024-07-19 PROCEDURE — 87340 HEPATITIS B SURFACE AG IA: CPT | Performed by: FAMILY MEDICINE

## 2024-07-19 PROCEDURE — 84466 ASSAY OF TRANSFERRIN: CPT | Performed by: FAMILY MEDICINE

## 2024-07-19 PROCEDURE — 83540 ASSAY OF IRON: CPT | Performed by: FAMILY MEDICINE

## 2024-07-19 SDOH — HEALTH STABILITY: PHYSICAL HEALTH: ON AVERAGE, HOW MANY DAYS PER WEEK DO YOU ENGAGE IN MODERATE TO STRENUOUS EXERCISE (LIKE A BRISK WALK)?: 2 DAYS

## 2024-07-19 ASSESSMENT — PAIN SCALES - GENERAL: PAINLEVEL: NO PAIN (0)

## 2024-07-19 ASSESSMENT — SOCIAL DETERMINANTS OF HEALTH (SDOH): HOW OFTEN DO YOU GET TOGETHER WITH FRIENDS OR RELATIVES?: ONCE A WEEK

## 2024-07-19 NOTE — PATIENT INSTRUCTIONS
Patient Education   Preventive Care Advice   This is general advice given by our system to help you stay healthy. However, your care team may have specific advice just for you. Please talk to your care team about your preventive care needs.  Nutrition  Eat 5 or more servings of fruits and vegetables each day.  Try wheat bread, brown rice and whole grain pasta (instead of white bread, rice, and pasta).  Get enough calcium and vitamin D. Check the label on foods and aim for 100% of the RDA (recommended daily allowance).  Lifestyle  Exercise at least 150 minutes each week  (30 minutes a day, 5 days a week).  Do muscle strengthening activities 2 days a week. These help control your weight and prevent disease.  No smoking.  Wear sunscreen to prevent skin cancer.  Have a dental exam and cleaning every 6 months.  Yearly exams  See your health care team every year to talk about:  Any changes in your health.  Any medicines your care team has prescribed.  Preventive care, family planning, and ways to prevent chronic diseases.  Shots (vaccines)   HPV shots (up to age 26), if you've never had them before.  Hepatitis B shots (up to age 59), if you've never had them before.  COVID-19 shot: Get this shot when it's due.  Flu shot: Get a flu shot every year.  Tetanus shot: Get a tetanus shot every 10 years.  Pneumococcal, hepatitis A, and RSV shots: Ask your care team if you need these based on your risk.  Shingles shot (for age 50 and up)  General health tests  Diabetes screening:  Starting at age 35, Get screened for diabetes at least every 3 years.  If you are younger than age 35, ask your care team if you should be screened for diabetes.  Cholesterol test: At age 39, start having a cholesterol test every 5 years, or more often if advised.  Bone density scan (DEXA): At age 50, ask your care team if you should have this scan for osteoporosis (brittle bones).  Hepatitis C: Get tested at least once in your life.  STIs (sexually  transmitted infections)  Before age 24: Ask your care team if you should be screened for STIs.  After age 24: Get screened for STIs if you're at risk. You are at risk for STIs (including HIV) if:  You are sexually active with more than one person.  You don't use condoms every time.  You or a partner was diagnosed with a sexually transmitted infection.  If you are at risk for HIV, ask about PrEP medicine to prevent HIV.  Get tested for HIV at least once in your life, whether you are at risk for HIV or not.  Cancer screening tests  Cervical cancer screening: If you have a cervix, begin getting regular cervical cancer screening tests starting at age 21.  Breast cancer scan (mammogram): If you've ever had breasts, begin having regular mammograms starting at age 40. This is a scan to check for breast cancer.  Colon cancer screening: It is important to start screening for colon cancer at age 45.  Have a colonoscopy test every 10 years (or more often if you're at risk) Or, ask your provider about stool tests like a FIT test every year or Cologuard test every 3 years.  To learn more about your testing options, visit:   .  For help making a decision, visit:   https://bit.ly/gh36645.  Prostate cancer screening test: If you have a prostate, ask your care team if a prostate cancer screening test (PSA) at age 55 is right for you.  Lung cancer screening: If you are a current or former smoker ages 50 to 80, ask your care team if ongoing lung cancer screenings are right for you.  For informational purposes only. Not to replace the advice of your health care provider. Copyright   2023 [employer]. All rights reserved. Clinically reviewed by the [facility] Transitions Program. [employer] [id number] - REV 01/24.

## 2024-07-19 NOTE — PROGRESS NOTES
Preventive Care Visit  [facility]  Romero Arroyo MD, Family Medicine  Jul 19, 2024      Assessment & Plan     Routine general medical examination at a health care facility  Preventive care reviewed and updated.    - Lipid panel reflex to direct LDL Non-fasting; Future  - CBC with Platelets & Differential; Future  - Hemoglobin A1c; Future  - Lipid panel reflex to direct LDL Non-fasting  - CBC with Platelets & Differential  - Hemoglobin A1c    Liver lesion  Noted on recent US abdomen   US demonstrates Indeterminate hypoechoic focal region at the left liver is  identified. Recommend further imaging characterization with liver MRI.  Fatty liver noted. Hepatomegaly.  Further evaluation with MRI is recommended    - MR Liver wo & w Contrast; Future    Fatty liver  Likely related to alcohol use  Drinks 2-3 days per week - more than 10 drinks since age 22   Patient was counseled against alcohol use to avoid more damage to the liver   - MR Liver wo & w Contrast; Future  - Hepatitis B Surface Antibody; Future  - Hepatitis B surface antigen; Future  - Alkaline phosphatase; Future  - INR; Future  - Partial thromboplastin time; Future  - Ferritin; Future  - Transferrin; Future  - Iron; Future  - Anti Nuclear Tiffanie IgG by IFA with Reflex; Future  - IgG; Future  - Hepatitis C antibody; Future  - Alpha-1-antitrypsin total; Future  - Ceruloplasmin; Future  - Hepatitis B Surface Antibody  - Hepatitis B surface antigen  - Alkaline phosphatase  - INR  - Partial thromboplastin time  - Ferritin  - Transferrin  - Iron  - Anti Nuclear Tiffanie IgG by IFA with Reflex  - IgG  - Hepatitis C antibody  - Alpha-1-antitrypsin total  - Ceruloplasmin    Patient has been advised of split billing requirements and indicates understanding: Yes        BMI  Estimated body mass index is 27.26 kg/m  as calculated from the following:    Height as of this encounter: 1.829 m (6').    Weight as of this encounter: 91.2 kg (201 lb).   Weight  management plan: Discussed healthy diet and exercise guidelines    Counseling  Appropriate preventive services were addressed with this patient via screening, questionnaire, or discussion as appropriate for fall prevention, nutrition, physical activity, Tobacco-use cessation, weight loss and cognition.  Checklist reviewing preventive services available has been given to the patient.  Reviewed patient's diet, addressing concerns and/or questions.   He is at risk for lack of exercise and has been provided with information to increase physical activity for the benefit of his well-being.   The patient reports drinking more than 3 alcoholic drinks per day and/or more than 7 drhnks per week. The patient was counseled and given information about possible harmful effects of excessive alcohol intake.    Work on weight loss  Regular exercise    Jake Shen is a 38 year old, presenting for the following:  Physical        7/19/2024     8:38 AM   Additional Questions   Roomed by Brittany GUAJARDO   Accompanied by Self        Health Care Directive  Patient does not have a Health Care Directive or Living Will: Discussed advance care planning with patient; however, patient declined at this time.    HPI    Preventive -     Immunization History   Administered Date(s) Administered    DTAP (<7y) 01/09/1987, 03/11/1987, 05/15/1987, 02/22/1988, 08/15/1991    HIB (PRP-T) 03/09/1989    Hepatitis B, Peds 09/22/1994, 10/24/1994    MMR 02/22/1988, 08/15/1991    OPV, unspecified 01/09/1987, 03/11/1987, 05/15/1987, 02/22/1988, 08/15/1991    TDAP Vaccine (Adacel) 08/30/2017    Td (Adult), Adsorbed 08/20/1998           - Colon CA screen: Colonoscopy, age 45-75 every 10 years or FIT every year or Cologuard every 3 years   No fam hx of colon cancer         -lipids screen: ordered     Diabetes screen: ordered                 7/19/2024   General Health   How would you rate your overall physical health? Good   Feel stress (tense, anxious, or unable to  sleep) To some extent      (!) STRESS CONCERN      7/19/2024   Nutrition   Three or more servings of calcium each day? (!) NO   Diet: Regular (no restrictions)   How many servings of fruit and vegetables per day? (!) 0-1   How many sweetened beverages each day? 0-1            7/19/2024   Exercise   Days per week of moderate/strenous exercise 2 days      (!) EXERCISE CONCERN      7/19/2024   Social Factors   Frequency of gathering with friends or relatives Once a week   Worry food won't last until get money to buy more No   Food not last or not have enough money for food? No   Do you have housing? (Housing is defined as stable permanent housing and does not include staying ouside in a car, in a tent, in an abandoned building, in an overnight shelter, or couch-surfing.) Yes   Are you worried about losing your housing? No   Lack of transportation? No   Unable to get utilities (heat,electricity)? No            7/19/2024   Dental   Dentist two times every year? Yes            7/19/2024   TB Screening   Were you born outside of the [place]? No            Today's PHQ-2 Score:       7/19/2024     8:43 AM   PHQ-2 ( 1999 [employer])   Q1: Little interest or pleasure in doing things 0   Q2: Feeling down, depressed or hopeless 0   PHQ-2 Score 0   Q1: Little interest or pleasure in doing things Not at all   Q2: Feeling down, depressed or hopeless Not at all   PHQ-2 Score 0     SH:    Marital status:    Kids: 2  Employment: cabinet making   Exercise: yes 2 days per week   Tobacco: no   Etoh: 2-3 times per week 10 or more   Recreational drugs: no         7/19/2024   Substance Use   Alcohol more than 3/day or more than 7/wk Yes   How often do you have a drink containing alcohol 2 to 3 times a week   How many alcohol drinks on typical day 10 or more   How often do you have 5+ drinks at one occasion Weekly   Audit 2/3 Score 7   How often not able to stop drinking once started Never   How often failed to do what normally expected Never    How often needed first drink in am after a heavy drinking session Never   How often feeling of guilt or remorse after drinking Never   How often unable to remember what happened the night before Never   Have you or someone else been injured because of your drinking No   Has anyone been concerned or suggested you cut down on drinking Yes, during the last year   TOTAL SCORE - AUDIT 14   Do you use any other substances recreationally? No        Social History     Tobacco Use    Smoking status: Never    Smokeless tobacco: Never   Vaping Use    Vaping status: Never Used   Substance Use Topics    Alcohol use: Yes     Comment: occ    Drug use: No             7/19/2024   One time HIV Screening   Previous HIV test? No          7/19/2024   STI Screening   New sexual partner(s) since last STI/HIV test? No            7/19/2024   Contraception/Family Planning   Questions about contraception or family planning No           Reviewed and updated as needed this visit by Provider                    Past Medical History:   Diagnosis Date    Anxiety     NO ACTIVE PROBLEMS      Past Surgical History:   Procedure Laterality Date    NO HISTORY OF SURGERY       Lab work is in process  Labs reviewed in EPIC  BP Readings from Last 3 Encounters:   07/19/24 136/87   07/15/24 (!) 152/85   09/19/23 131/84    Wt Readings from Last 3 Encounters:   07/19/24 91.2 kg (201 lb)   07/15/24 91.6 kg (202 lb)   09/19/23 91.2 kg (201 lb)                  Patient Active Problem List   Diagnosis    Right groin pain    Rash and nonspecific skin eruption    Blood in semen    Elevated cholesterol    Glucosuria    Laceration of right hand without foreign body, subsequent encounter     Past Surgical History:   Procedure Laterality Date    NO HISTORY OF SURGERY         Social History     Tobacco Use    Smoking status: Never    Smokeless tobacco: Never   Substance Use Topics    Alcohol use: Yes     Comment: occ     Family History   Problem Relation Age of Onset     No Known Problems Mother     No Known Problems Father         unknown    No Known Problems Brother     Diabetes Maternal Grandmother     Cancer Maternal Grandfather         throat; cancer    No Known Problems Brother          Current Outpatient Medications   Medication Sig Dispense Refill    FLUoxetine (PROZAC) 10 MG capsule TAKE 1 CAPSULE BY MOUTH EVERY DAY 90 capsule 0    clobetasol (TEMOVATE) 0.05 % external ointment Apply topically 2 times daily To psoriasis for up to 1 month then decrease to once daily until follow up (Patient not taking: Reported on 7/15/2024) 120 g 3    tretinoin (RETIN-A) 0.05 % external cream Apply topically At Bedtime (Patient not taking: Reported on 11/9/2023) 45 g 6     No Known Allergies  Recent Labs   Lab Test 07/15/24  1748 12/28/22  0726 07/28/22  1342 07/28/22  1342 03/08/19  0919 07/06/18  0817 06/11/18  1911   A1C  --   --   --  5.1  --  5.0  --    LDL  --  137*  --  135*  --  123* 95   HDL  --  53  --  52  --  50 46   TRIG  --  93  --  62  --  97 196*   ALT 26 38  --  25  --   --   --    CR 1.10 1.09   < > 1.14 0.99  --  1.08   GFRESTIMATED 89 90   < > 86 >90  --  79   GFRESTBLACK  --   --   --   --  >90  --  >90   POTASSIUM 4.3 3.8   < > 4.3 4.6  --  3.4   TSH  --  1.52  --  0.86  --   --   --     < > = values in this interval not displayed.          Review of Systems  Constitutional, HEENT, cardiovascular, pulmonary, gi and gu systems are negative, except as otherwise noted.     Objective    Exam  /87   Pulse 70   Temp 98.1  F (36.7  C) (Tympanic)   Resp 16   Ht 1.829 m (6')   Wt 91.2 kg (201 lb)   SpO2 97%   BMI 27.26 kg/m     Estimated body mass index is 27.26 kg/m  as calculated from the following:    Height as of this encounter: 1.829 m (6').    Weight as of this encounter: 91.2 kg (201 lb).    Physical Exam  GENERAL: alert and no distress  NECK: no adenopathy, no asymmetry, masses, or scars  RESP: lungs clear to auscultation - no rales, rhonchi or wheezes  CV:  regular rate and rhythm, normal S1 S2, no S3 or S4, no murmur, click or rub, no peripheral edema  ABDOMEN: soft, nontender, no hepatosplenomegaly, no masses and bowel sounds normal  MS: no gross musculoskeletal defects noted, no edema        Signed Electronically by: Romero Arroyo MD

## 2024-07-22 LAB
A1AT SERPL-MCNC: 113 MG/DL (ref 90–200)
ANA SER QL IF: NEGATIVE
CERULOPLASMIN SERPL-MCNC: 25 MG/DL (ref 20–60)
IGG SERPL-MCNC: 1051 MG/DL (ref 610–1616)

## 2024-08-04 ENCOUNTER — HOSPITAL ENCOUNTER (OUTPATIENT)
Dept: MRI IMAGING | Facility: CLINIC | Age: 39
Discharge: HOME | End: 2024-08-04
Attending: FAMILY MEDICINE | Admitting: FAMILY MEDICINE
Payer: COMMERCIAL

## 2024-08-04 DIAGNOSIS — K76.9 LIVER LESION: ICD-10-CM

## 2024-08-04 DIAGNOSIS — K76.0 FATTY LIVER: ICD-10-CM

## 2024-08-04 PROCEDURE — 258N000003 HC RX IP 258 OP 636: Performed by: FAMILY MEDICINE

## 2024-08-04 PROCEDURE — 255N000002 HC RX 255 OP 636: Performed by: FAMILY MEDICINE

## 2024-08-04 PROCEDURE — A9585 GADOBUTROL INJECTION: HCPCS | Performed by: FAMILY MEDICINE

## 2024-08-04 PROCEDURE — 74183 MRI ABD W/O CNTR FLWD CNTR: CPT

## 2024-08-04 RX ORDER — GADOBUTROL 604.72 MG/ML
9 INJECTION INTRAVENOUS ONCE
Status: COMPLETED | OUTPATIENT
Start: 2024-08-04 | End: 2024-08-04

## 2024-08-04 RX ADMIN — GADOBUTROL 9 ML: 604.72 INJECTION INTRAVENOUS at 09:54

## 2024-08-04 RX ADMIN — SODIUM CHLORIDE 50 ML: 9 INJECTION, SOLUTION INTRAVENOUS at 09:54

## 2024-09-13 DIAGNOSIS — F41.9 ANXIETY: ICD-10-CM

## 2024-09-13 RX ORDER — FLUOXETINE 10 MG/1
10 CAPSULE ORAL DAILY
Qty: 90 CAPSULE | Refills: 0 | Status: SHIPPED | OUTPATIENT
Start: 2024-09-13

## 2024-12-23 DIAGNOSIS — F41.9 ANXIETY: ICD-10-CM

## 2024-12-23 RX ORDER — FLUOXETINE 10 MG/1
10 CAPSULE ORAL DAILY
Qty: 90 CAPSULE | Refills: 0 | Status: SHIPPED | OUTPATIENT
Start: 2024-12-23

## 2025-01-11 ENCOUNTER — TRANSFERRED RECORDS (OUTPATIENT)
Dept: HEALTH INFORMATION MANAGEMENT | Facility: CLINIC | Age: 40
End: 2025-01-11
Payer: COMMERCIAL

## 2025-04-23 SDOH — HEALTH STABILITY: PHYSICAL HEALTH: ON AVERAGE, HOW MANY MINUTES DO YOU ENGAGE IN EXERCISE AT THIS LEVEL?: 90 MIN

## 2025-04-23 SDOH — HEALTH STABILITY: PHYSICAL HEALTH: ON AVERAGE, HOW MANY DAYS PER WEEK DO YOU ENGAGE IN MODERATE TO STRENUOUS EXERCISE (LIKE A BRISK WALK)?: 6 DAYS

## 2025-04-23 ASSESSMENT — SOCIAL DETERMINANTS OF HEALTH (SDOH): HOW OFTEN DO YOU GET TOGETHER WITH FRIENDS OR RELATIVES?: ONCE A WEEK

## 2025-04-24 ENCOUNTER — OFFICE VISIT (OUTPATIENT)
Dept: FAMILY MEDICINE | Facility: CLINIC | Age: 40
End: 2025-04-24
Payer: COMMERCIAL

## 2025-04-24 VITALS
DIASTOLIC BLOOD PRESSURE: 81 MMHG | BODY MASS INDEX: 26.82 KG/M2 | SYSTOLIC BLOOD PRESSURE: 135 MMHG | OXYGEN SATURATION: 98 % | TEMPERATURE: 97.6 F | RESPIRATION RATE: 14 BRPM | HEART RATE: 67 BPM | HEIGHT: 72 IN | WEIGHT: 198 LBS

## 2025-04-24 DIAGNOSIS — N63.10 MASS OF RIGHT BREAST, UNSPECIFIED QUADRANT: ICD-10-CM

## 2025-04-24 DIAGNOSIS — Z00.00 ROUTINE GENERAL MEDICAL EXAMINATION AT A HEALTH CARE FACILITY: Primary | ICD-10-CM

## 2025-04-24 LAB
BASOPHILS # BLD AUTO: 0 10E3/UL (ref 0–0.2)
BASOPHILS NFR BLD AUTO: 0 %
EOSINOPHIL # BLD AUTO: 0.2 10E3/UL (ref 0–0.7)
EOSINOPHIL NFR BLD AUTO: 3 %
ERYTHROCYTE [DISTWIDTH] IN BLOOD BY AUTOMATED COUNT: 12.1 % (ref 10–15)
EST. AVERAGE GLUCOSE BLD GHB EST-MCNC: 103 MG/DL
HBA1C MFR BLD: 5.2 % (ref 0–5.6)
HCT VFR BLD AUTO: 44.6 % (ref 40–53)
HGB BLD-MCNC: 15.1 G/DL (ref 13.3–17.7)
IMM GRANULOCYTES # BLD: 0 10E3/UL
IMM GRANULOCYTES NFR BLD: 0 %
LYMPHOCYTES # BLD AUTO: 1.7 10E3/UL (ref 0.8–5.3)
LYMPHOCYTES NFR BLD AUTO: 23 %
MCH RBC QN AUTO: 29.9 PG (ref 26.5–33)
MCHC RBC AUTO-ENTMCNC: 33.9 G/DL (ref 31.5–36.5)
MCV RBC AUTO: 88 FL (ref 78–100)
MONOCYTES # BLD AUTO: 0.5 10E3/UL (ref 0–1.3)
MONOCYTES NFR BLD AUTO: 7 %
NEUTROPHILS # BLD AUTO: 5 10E3/UL (ref 1.6–8.3)
NEUTROPHILS NFR BLD AUTO: 67 %
PLATELET # BLD AUTO: 223 10E3/UL (ref 150–450)
RBC # BLD AUTO: 5.05 10E6/UL (ref 4.4–5.9)
WBC # BLD AUTO: 7.4 10E3/UL (ref 4–11)

## 2025-04-24 ASSESSMENT — PAIN SCALES - GENERAL: PAINLEVEL_OUTOF10: NO PAIN (0)

## 2025-04-24 NOTE — PROGRESS NOTES
Preventive Care Visit  [facility]  Romero Arroyo MD, Family Medicine  Apr 24, 2025      Assessment & Plan     Routine general medical examination at a health care facility  Preventive care reviewed and updated.    Health maintenance screening and immunizations reviewed with the patient.    We discussed healthy lifestyle, nutrition, cardiovascular risk reduction.  - Screen Labs ordered   - Continue great active lifestyle  - Follow up yearly for the annual physical and preventive care.    - CBC with Platelets & Differential; Future  - Comprehensive metabolic panel; Future  - Hemoglobin A1c; Future  - Lipid panel reflex to direct LDL Non-fasting; Future  - CBC with Platelets & Differential  - Comprehensive metabolic panel  - Hemoglobin A1c  - Lipid panel reflex to direct LDL Non-fasting    Mass of right breast, unspecified quadrant  Patient reports mass of the breast noticed 2 months ago.  No pain or tenderness.  No family history of breast cancer.  Unable to palpate the mass on exam, will refer for diagnostic mammogram for further evaluation.  - MA Diagnostic Right w/ [place]; Future                BMI  Estimated body mass index is 26.85 kg/m  as calculated from the following:    Height as of this encounter: 1.829 m (6').    Weight as of this encounter: 89.8 kg (198 lb).       Counseling  Appropriate preventive services were addressed with this patient via screening, questionnaire, or discussion as appropriate for fall prevention, nutrition, physical activity, Tobacco-use cessation, social engagement, weight loss and cognition.  Checklist reviewing preventive services available has been given to the patient.  Reviewed patient's diet, addressing concerns and/or questions.           Jake Shen is a 39 year old, presenting for the following:  Physical          Right side lump noticed 2 months  no pain   No fam of breast cancer   No supment         4/24/2025     3:59 PM   Additional Questions    Roomed by Carlos Gonzalez MA   Accompanied by Self         4/24/2025   Forms   Any forms needing to be completed Yes      Wt Readings from Last 4 Encounters:   04/24/25 89.8 kg (198 lb)   07/19/24 91.2 kg (201 lb)   07/15/24 91.6 kg (202 lb)   09/19/23 91.2 kg (201 lb)     Preventive     Immunization History   Administered Date(s) Administered    DTAP (<7y) 01/09/1987, 03/11/1987, 05/15/1987, 02/22/1988, 08/15/1991    HIB (PRP-T) 03/09/1989    Hepatitis B, Peds (Engerix-B/Recombivax HB) 09/22/1994, 10/24/1994    MMR (MMRII) 02/22/1988, 08/15/1991    OPV, unspecified 01/09/1987, 03/11/1987, 05/15/1987, 02/22/1988, 08/15/1991    TDAP Vaccine (Adacel) 08/30/2017    Td (Adult), Adsorbed 08/20/1998         - Colon CA screen: Colonoscopy, age 45-75 every 10 years or FIT every year or Cologuard every 3 years   No fam hx of colon cancer         HPI           Advance Care Planning    Discussed advance care planning with patient; however, patient declined at this time.        4/23/2025   General Health   How would you rate your overall physical health? Good   Feel stress (tense, anxious, or unable to sleep) Only a little   (!) STRESS CONCERN      4/23/2025   Nutrition   Three or more servings of calcium each day? Yes   Diet: Regular (no restrictions)   How many servings of fruit and vegetables per day? (!) 2-3   How many sweetened beverages each day? 0-1         4/23/2025   Exercise   Days per week of moderate/strenous exercise 6 days   Average minutes spent exercising at this level 90 min         4/23/2025   Social Factors   Frequency of gathering with friends or relatives Once a week   Worry food won't last until get money to buy more No   Food not last or not have enough money for food? No   Do you have housing? (Housing is defined as stable permanent housing and does not include staying outside in a car, in a tent, in an abandoned building, in an overnight shelter, or couch-surfing.) Yes   Are you worried about losing your  housing? No   Lack of transportation? No   Unable to get utilities (heat,electricity)? No         4/23/2025   Dental   Dentist two times every year? Yes         Today's PHQ-2 Score:       4/23/2025     5:22 PM   PHQ-2 ( 1999 Pfizer)   Q1: Little interest or pleasure in doing things 0   Q2: Feeling down, depressed or hopeless 0   PHQ-2 Score 0    Q1: Little interest or pleasure in doing things Not at all   Q2: Feeling down, depressed or hopeless Not at all   PHQ-2 Score 0       Patient-reported           4/23/2025   Substance Use   Alcohol more than 3/day or more than 7/wk Not Applicable   Do you use any other substances recreationally? No     Social History     Tobacco Use    Smoking status: Never    Smokeless tobacco: Never   Vaping Use    Vaping status: Never Used   Substance Use Topics    Alcohol use: Not Currently     Comment: occ    Drug use: No             4/23/2025   One time HIV Screening   Previous HIV test? No         4/23/2025   STI Screening   New sexual partner(s) since last STI/HIV test? No         4/23/2025   Contraception/Family Planning   Questions about contraception or family planning No        Reviewed and updated as needed this visit by Provider                    Past Medical History:   Diagnosis Date    Anxiety     NO ACTIVE PROBLEMS      Past Surgical History:   Procedure Laterality Date    NO HISTORY OF SURGERY       Lab work is in process  Labs reviewed in EPIC  BP Readings from Last 3 Encounters:   04/24/25 135/81   07/19/24 136/87   07/15/24 (!) 152/85    Wt Readings from Last 3 Encounters:   04/24/25 89.8 kg (198 lb)   07/19/24 91.2 kg (201 lb)   07/15/24 91.6 kg (202 lb)                  Patient Active Problem List   Diagnosis    Right groin pain    Rash and nonspecific skin eruption    Blood in semen    Elevated cholesterol    Glucosuria    Laceration of right hand without foreign body, subsequent encounter     Past Surgical History:   Procedure Laterality Date    NO HISTORY OF SURGERY          Social History     Tobacco Use    Smoking status: Never    Smokeless tobacco: Never   Substance Use Topics    Alcohol use: Not Currently     Comment: occ     Family History   Problem Relation Age of Onset    No Known Problems Mother     No Known Problems Father         unknown    No Known Problems Brother     Diabetes Maternal Grandmother     Cancer Maternal Grandfather         throat; cancer    No Known Problems Brother          Current Outpatient Medications   Medication Sig Dispense Refill    FLUoxetine (PROZAC) 10 MG capsule Take 1 capsule (10 mg) by mouth daily. 90 capsule 0     No Known Allergies  Recent Labs   Lab Test 04/24/25  1639 07/19/24  0910 07/15/24  1748 12/28/22  0726 07/28/22  1342 07/28/22  1342 03/08/19  0919 07/06/18  0817 06/11/18  1911   A1C 5.2 5.0  --   --   --  5.1  --    < >  --    LDL  --  112*  --  137*  --  135*  --    < > 95   HDL  --  49  --  53  --  52  --    < > 46   TRIG  --  62  --  93  --  62  --    < > 196*   ALT  --   --  26 38  --  25  --   --   --    CR  --   --  1.10 1.09   < > 1.14 0.99  --  1.08   GFRESTIMATED  --   --  89 90   < > 86 >90  --  79   GFRESTBLACK  --   --   --   --   --   --  >90  --  >90   POTASSIUM  --   --  4.3 3.8   < > 4.3 4.6  --  3.4   TSH  --   --   --  1.52  --  0.86  --   --   --     < > = values in this interval not displayed.               Review of Systems  Constitutional, HEENT, cardiovascular, pulmonary, gi and gu systems are negative, except as otherwise noted.     Objective    Exam  /81   Pulse 67   Temp 97.6  F (36.4  C) (Tympanic)   Resp 14   Ht 1.829 m (6')   Wt 89.8 kg (198 lb)   SpO2 98%   BMI 26.85 kg/m     Estimated body mass index is 26.85 kg/m  as calculated from the following:    Height as of this encounter: 1.829 m (6').    Weight as of this encounter: 89.8 kg (198 lb).    Physical Exam  Vitals and nursing note reviewed.   Constitutional:       General: He is not in acute distress.     Appearance: Normal  appearance. He is not ill-appearing, toxic-appearing or diaphoretic.   HENT:      Head: Normocephalic and atraumatic.   Cardiovascular:      Rate and Rhythm: Normal rate and regular rhythm.      Heart sounds: No murmur heard.     No friction rub. No gallop.   Pulmonary:      Effort: No respiratory distress.      Breath sounds: No stridor. No wheezing or rhonchi.   Chest:      Chest wall: No tenderness.       Skin:     Capillary Refill: Capillary refill takes less than 2 seconds.   Neurological:      General: No focal deficit present.      Mental Status: He is alert.               Signed Electronically by: Romero Arroyo MD

## 2025-04-24 NOTE — PATIENT INSTRUCTIONS
Patient Education   Preventive Care Advice   This is general advice given by our system to help you stay healthy. However, your care team may have specific advice just for you. Please talk to your care team about your preventive care needs.  Nutrition  Eat 5 or more servings of fruits and vegetables each day.  Try wheat bread, brown rice and whole grain pasta (instead of white bread, rice, and pasta).  Get enough calcium and vitamin D. Check the label on foods and aim for 100% of the RDA (recommended daily allowance).  Lifestyle  Exercise at least 150 minutes each week  (30 minutes a day, 5 days a week).  Do muscle strengthening activities 2 days a week. These help control your weight and prevent disease.  No smoking.  Wear sunscreen to prevent skin cancer.  Have a dental exam and cleaning every 6 months.  Yearly exams  See your health care team every year to talk about:  Any changes in your health.  Any medicines your care team has prescribed.  Preventive care, family planning, and ways to prevent chronic diseases.  Shots (vaccines)   HPV shots (up to age 26), if you've never had them before.  Hepatitis B shots (up to age 59), if you've never had them before.  COVID-19 shot: Get this shot when it's due.  Flu shot: Get a flu shot every year.  Tetanus shot: Get a tetanus shot every 10 years.  Pneumococcal, hepatitis A, and RSV shots: Ask your care team if you need these based on your risk.  Shingles shot (for age 50 and up)  General health tests  Diabetes screening:  Starting at age 35, Get screened for diabetes at least every 3 years.  If you are younger than age 35, ask your care team if you should be screened for diabetes.  Cholesterol test: At age 39, start having a cholesterol test every 5 years, or more often if advised.  Bone density scan (DEXA): At age 50, ask your care team if you should have this scan for osteoporosis (brittle bones).  Hepatitis C: Get tested at least once in your life.  STIs (sexually  transmitted infections)  Before age 24: Ask your care team if you should be screened for STIs.  After age 24: Get screened for STIs if you're at risk. You are at risk for STIs (including HIV) if:  You are sexually active with more than one person.  You don't use condoms every time.  You or a partner was diagnosed with a sexually transmitted infection.  If you are at risk for HIV, ask about PrEP medicine to prevent HIV.  Get tested for HIV at least once in your life, whether you are at risk for HIV or not.  Cancer screening tests  Cervical cancer screening: If you have a cervix, begin getting regular cervical cancer screening tests starting at age 21.  Breast cancer scan (mammogram): If you've ever had breasts, begin having regular mammograms starting at age 40. This is a scan to check for breast cancer.  Colon cancer screening: It is important to start screening for colon cancer at age 45.  Have a colonoscopy test every 10 years (or more often if you're at risk) Or, ask your provider about stool tests like a FIT test every year or Cologuard test every 3 years.  To learn more about your testing options, visit:   .  For help making a decision, visit:   https://bit.ly/db25591.  Prostate cancer screening test: If you have a prostate, ask your care team if a prostate cancer screening test (PSA) at age 55 is right for you.  Lung cancer screening: If you are a current or former smoker ages 50 to 80, ask your care team if ongoing lung cancer screenings are right for you.  For informational purposes only. Not to replace the advice of your health care provider. Copyright   2023 [employer]. All rights reserved. Clinically reviewed by the [facility] Transitions Program. [employer] [id number] - REV 01/24.

## 2025-06-23 DIAGNOSIS — F41.9 ANXIETY: ICD-10-CM

## 2025-06-23 RX ORDER — FLUOXETINE 10 MG/1
10 CAPSULE ORAL DAILY
Qty: 90 CAPSULE | Refills: 3 | Status: SHIPPED | OUTPATIENT
Start: 2025-06-23